# Patient Record
Sex: FEMALE | Race: WHITE | NOT HISPANIC OR LATINO | Employment: FULL TIME | URBAN - METROPOLITAN AREA
[De-identification: names, ages, dates, MRNs, and addresses within clinical notes are randomized per-mention and may not be internally consistent; named-entity substitution may affect disease eponyms.]

---

## 2017-01-18 ENCOUNTER — TELEPHONE (OUTPATIENT)
Dept: FAMILY MEDICINE CLINIC | Facility: CLINIC | Age: 32
End: 2017-01-18

## 2017-01-18 RX ORDER — DEXTROAMPHETAMINE SACCHARATE, AMPHETAMINE ASPARTATE MONOHYDRATE, DEXTROAMPHETAMINE SULFATE AND AMPHETAMINE SULFATE 7.5; 7.5; 7.5; 7.5 MG/1; MG/1; MG/1; MG/1
30 CAPSULE, EXTENDED RELEASE ORAL EVERY MORNING
Qty: 30 CAPSULE | Refills: 0 | Status: SHIPPED | OUTPATIENT
Start: 2017-01-18 | End: 2017-02-17 | Stop reason: SDUPTHER

## 2017-01-18 RX ORDER — HYDROCODONE BITARTRATE AND ACETAMINOPHEN 10; 325 MG/1; MG/1
1 TABLET ORAL 4 TIMES DAILY PRN
Qty: 120 TABLET | Refills: 0 | Status: SHIPPED | OUTPATIENT
Start: 2017-01-18 | End: 2017-02-17 | Stop reason: SDUPTHER

## 2017-01-18 RX ORDER — DEXTROAMPHETAMINE SACCHARATE, AMPHETAMINE ASPARTATE, DEXTROAMPHETAMINE SULFATE AND AMPHETAMINE SULFATE 3.75; 3.75; 3.75; 3.75 MG/1; MG/1; MG/1; MG/1
15 TABLET ORAL DAILY
Qty: 30 TABLET | Refills: 0 | Status: SHIPPED | OUTPATIENT
Start: 2017-01-18 | End: 2017-02-17 | Stop reason: SDUPTHER

## 2017-01-18 NOTE — TELEPHONE ENCOUNTER
RX'S ARE UP FRONT AND READY TO BE PICKED UP. PT IS AWARE.     ----- Message from Shania Harmon sent at 1/18/2017  1:26 PM EST -----  Contact: PT  PT NEEDS NEW WRITTEN RX'S    HYDROCODONE-ACETAM  MG 1 TAB 4 TIMES A DAY    AMPHETAMINE-DEXTRO 15 MG 1 TAB QD    AMPHETAMINE-DEXTRO 30 MG 24 HR CAPSULE 1 CAPSULE EVERY MORNING    PT'S # [phone]

## 2017-02-17 RX ORDER — DEXTROAMPHETAMINE SACCHARATE, AMPHETAMINE ASPARTATE MONOHYDRATE, DEXTROAMPHETAMINE SULFATE AND AMPHETAMINE SULFATE 7.5; 7.5; 7.5; 7.5 MG/1; MG/1; MG/1; MG/1
30 CAPSULE, EXTENDED RELEASE ORAL EVERY MORNING
Qty: 30 CAPSULE | Refills: 0 | Status: SHIPPED | OUTPATIENT
Start: 2017-02-17 | End: 2017-03-21 | Stop reason: SDUPTHER

## 2017-02-17 RX ORDER — DEXTROAMPHETAMINE SACCHARATE, AMPHETAMINE ASPARTATE, DEXTROAMPHETAMINE SULFATE AND AMPHETAMINE SULFATE 3.75; 3.75; 3.75; 3.75 MG/1; MG/1; MG/1; MG/1
15 TABLET ORAL DAILY
Qty: 30 TABLET | Refills: 0 | Status: SHIPPED | OUTPATIENT
Start: 2017-02-17 | End: 2017-03-21 | Stop reason: SDUPTHER

## 2017-02-17 RX ORDER — HYDROCODONE BITARTRATE AND ACETAMINOPHEN 10; 325 MG/1; MG/1
1 TABLET ORAL 4 TIMES DAILY PRN
Qty: 120 TABLET | Refills: 0 | Status: SHIPPED | OUTPATIENT
Start: 2017-02-17 | End: 2017-03-21 | Stop reason: SDUPTHER

## 2017-02-20 ENCOUNTER — TELEPHONE (OUTPATIENT)
Dept: FAMILY MEDICINE CLINIC | Facility: CLINIC | Age: 32
End: 2017-02-20

## 2017-02-20 NOTE — TELEPHONE ENCOUNTER
RX'S ARE UP FRONT AND READY TO BE PICKED UP. PT IS AWARE.     ----- Message from Coretta Morales sent at 2/17/2017 11:15 AM EST -----  NEEDS A WRITTEN RX     ADDERALL ER  15MG # 30     ADDERELL  TIME RELEASE 30 MG # 30     NORCO  #120     PLEASE CALL PT WHEN READY TO PU     [phone]

## 2017-03-21 ENCOUNTER — OFFICE VISIT (OUTPATIENT)
Dept: FAMILY MEDICINE CLINIC | Facility: CLINIC | Age: 32
End: 2017-03-21

## 2017-03-21 VITALS
TEMPERATURE: 97.7 F | WEIGHT: 217.6 LBS | BODY MASS INDEX: 34.97 KG/M2 | DIASTOLIC BLOOD PRESSURE: 70 MMHG | HEART RATE: 99 BPM | OXYGEN SATURATION: 99 % | HEIGHT: 66 IN | SYSTOLIC BLOOD PRESSURE: 110 MMHG

## 2017-03-21 DIAGNOSIS — G89.29 CHRONIC LOW BACK PAIN WITHOUT SCIATICA, UNSPECIFIED BACK PAIN LATERALITY: ICD-10-CM

## 2017-03-21 DIAGNOSIS — F98.8 ATTENTION DEFICIT DISORDER: Primary | ICD-10-CM

## 2017-03-21 DIAGNOSIS — M54.50 CHRONIC LOW BACK PAIN WITHOUT SCIATICA, UNSPECIFIED BACK PAIN LATERALITY: ICD-10-CM

## 2017-03-21 PROCEDURE — 99213 OFFICE O/P EST LOW 20 MIN: CPT | Performed by: INTERNAL MEDICINE

## 2017-03-21 RX ORDER — HYDROCODONE BITARTRATE AND ACETAMINOPHEN 10; 325 MG/1; MG/1
1 TABLET ORAL 4 TIMES DAILY PRN
Qty: 120 TABLET | Refills: 0 | Status: SHIPPED | OUTPATIENT
Start: 2017-03-21 | End: 2017-04-18 | Stop reason: SDUPTHER

## 2017-03-21 RX ORDER — DEXTROAMPHETAMINE SACCHARATE, AMPHETAMINE ASPARTATE MONOHYDRATE, DEXTROAMPHETAMINE SULFATE AND AMPHETAMINE SULFATE 7.5; 7.5; 7.5; 7.5 MG/1; MG/1; MG/1; MG/1
30 CAPSULE, EXTENDED RELEASE ORAL EVERY MORNING
Qty: 30 CAPSULE | Refills: 0 | Status: SHIPPED | OUTPATIENT
Start: 2017-03-21 | End: 2017-04-18 | Stop reason: SDUPTHER

## 2017-03-21 RX ORDER — DEXTROAMPHETAMINE SACCHARATE, AMPHETAMINE ASPARTATE, DEXTROAMPHETAMINE SULFATE AND AMPHETAMINE SULFATE 3.75; 3.75; 3.75; 3.75 MG/1; MG/1; MG/1; MG/1
15 TABLET ORAL DAILY
Qty: 30 TABLET | Refills: 0 | Status: SHIPPED | OUTPATIENT
Start: 2017-03-21 | End: 2017-04-18 | Stop reason: SDUPTHER

## 2017-03-21 NOTE — PROGRESS NOTES
Subjective   Bessy Mi is a 31 y.o. female. Patient is here today for follow-up on her chronic low back pain, ADD, intermittent anxiety the patient tells me that she is no longer on hormone treatments from one of the weight loss places.  Her back pain is present but controlled on current medicines and her ADD symptoms are under pretty good control again on her current regimen.  She's had no chest pain, shortness of breath, edema or significant myalgias.  She is not on any thyroid replacement either.  She does have an upcoming appointment with gynecologist in about 2 months    Chief Complaint   Patient presents with   • ADHD     NEEDS REFILLS ON ADDERALL'S    • Back Pain     NEEDS REFILL ON HYDROCODONE          Vitals:    03/21/17 1403   BP: 110/70   Pulse: 99   Temp: 97.7 °F (36.5 °C)   SpO2: 99%     The following portions of the patient's history were reviewed and updated as appropriate: allergies, current medications, past family history, past medical history, past social history, past surgical history and problem list.    Past Medical History   Diagnosis Date   • ADHD (attention deficit hyperactivity disorder)    • Anxiety    • Arthritis    • Hypothyroidism    • Low back pain    • Migraine headache       No Known Allergies   Social History     Social History   • Marital status:      Spouse name: N/A   • Number of children: N/A   • Years of education: N/A     Occupational History   • Not on file.     Social History Main Topics   • Smoking status: Current Every Day Smoker   • Smokeless tobacco: Not on file   • Alcohol use Yes      Comment: Very Rare   • Drug use: No   • Sexual activity: Not on file     Other Topics Concern   • Not on file     Social History Narrative        Current Outpatient Prescriptions:   •  amphetamine-dextroamphetamine (ADDERALL) 15 MG tablet, Take 1 tablet by mouth Daily., Disp: 30 tablet, Rfl: 0  •  amphetamine-dextroamphetamine XR (ADDERALL XR) 30 MG 24 hr capsule, Take 1  capsule by mouth Every Morning., Disp: 30 capsule, Rfl: 0  •  HYDROcodone-acetaminophen (NORCO)  MG per tablet, Take 1 tablet by mouth 4 (Four) Times a Day As Needed (PAIN)., Disp: 120 tablet, Rfl: 0     Objective     History of Present Illness     Review of Systems   Constitutional: Negative.    HENT: Negative.    Eyes: Negative.    Respiratory: Negative.    Cardiovascular: Negative.    Gastrointestinal: Negative.    Genitourinary: Negative.    Musculoskeletal: Negative.    Neurological: Negative.    Psychiatric/Behavioral: Negative.        Physical Exam   Constitutional: She appears well-developed and well-nourished.   Pleasant, cooperative and in no distress with a blood pressure of 120/80   HENT:   Head: Normocephalic and atraumatic.   Eyes: Conjunctivae are normal.   Neck: Normal range of motion. Neck supple. No thyromegaly present.   Cardiovascular: Normal rate, regular rhythm and normal heart sounds.    Pulmonary/Chest: Effort normal and breath sounds normal. No respiratory distress. She has no wheezes. She has no rales.   Musculoskeletal: Normal range of motion. She exhibits no edema.   Neurological: She is alert.   Skin: Skin is warm and dry.   Psychiatric: She has a normal mood and affect. Her behavior is normal.   Nursing note and vitals reviewed.      ASSESSMENT  overall the patient seems stable.  Blood pressures fine and heart and lungs sound fine her Tan was reviewed and is appropriate.     Problem List Items Addressed This Visit        Nervous and Auditory    Low back pain       Other    Attention deficit disorder - Primary    Relevant Medications    amphetamine-dextroamphetamine XR (ADDERALL XR) 30 MG 24 hr capsule    amphetamine-dextroamphetamine (ADDERALL) 15 MG tablet          PLAN  I refilled the patient's medications and she will continue his now and I'll recheck her in 3 months with a TSH and free T4, lipid panel and CMP    There are no Patient Instructions on file for this  visit.  Return in about 3 months (around 6/21/2017) for with labs.

## 2017-04-18 ENCOUNTER — TELEPHONE (OUTPATIENT)
Dept: FAMILY MEDICINE CLINIC | Facility: CLINIC | Age: 32
End: 2017-04-18

## 2017-04-18 RX ORDER — DEXTROAMPHETAMINE SACCHARATE, AMPHETAMINE ASPARTATE, DEXTROAMPHETAMINE SULFATE AND AMPHETAMINE SULFATE 3.75; 3.75; 3.75; 3.75 MG/1; MG/1; MG/1; MG/1
15 TABLET ORAL DAILY
Qty: 30 TABLET | Refills: 0 | Status: SHIPPED | OUTPATIENT
Start: 2017-04-18 | End: 2017-05-18 | Stop reason: SDUPTHER

## 2017-04-18 RX ORDER — HYDROCODONE BITARTRATE AND ACETAMINOPHEN 10; 325 MG/1; MG/1
1 TABLET ORAL 4 TIMES DAILY PRN
Qty: 120 TABLET | Refills: 0 | Status: SHIPPED | OUTPATIENT
Start: 2017-04-18 | End: 2017-05-18 | Stop reason: SDUPTHER

## 2017-04-18 RX ORDER — DEXTROAMPHETAMINE SACCHARATE, AMPHETAMINE ASPARTATE MONOHYDRATE, DEXTROAMPHETAMINE SULFATE AND AMPHETAMINE SULFATE 7.5; 7.5; 7.5; 7.5 MG/1; MG/1; MG/1; MG/1
30 CAPSULE, EXTENDED RELEASE ORAL EVERY MORNING
Qty: 30 CAPSULE | Refills: 0 | Status: SHIPPED | OUTPATIENT
Start: 2017-04-18 | End: 2017-05-18 | Stop reason: SDUPTHER

## 2017-04-18 NOTE — TELEPHONE ENCOUNTER
RX'S ARE UP FRONT AND READY TO BE PICKED UP. CALLED AND LEFT MESSAGE FOR PT THAT SHE CAN COME .    ----- Message from Coretta Morales sent at 4/18/2017  9:12 AM EDT -----  NEEDS WRITTEN RX FOR    ADDERRAL EXTENDED RELEASE  30 MG # 30     ADDERRAL INSTANT RELESASE  15 MG # 30     HYDROCODONE  #120    PLEASE CALL PT WHEN READY TO PU     [phone]

## 2017-05-18 RX ORDER — HYDROCODONE BITARTRATE AND ACETAMINOPHEN 10; 325 MG/1; MG/1
1 TABLET ORAL 4 TIMES DAILY PRN
Qty: 120 TABLET | Refills: 0 | Status: SHIPPED | OUTPATIENT
Start: 2017-05-18 | End: 2017-06-21 | Stop reason: SDUPTHER

## 2017-05-18 RX ORDER — DEXTROAMPHETAMINE SACCHARATE, AMPHETAMINE ASPARTATE, DEXTROAMPHETAMINE SULFATE AND AMPHETAMINE SULFATE 3.75; 3.75; 3.75; 3.75 MG/1; MG/1; MG/1; MG/1
15 TABLET ORAL DAILY
Qty: 30 TABLET | Refills: 0 | Status: SHIPPED | OUTPATIENT
Start: 2017-05-18 | End: 2017-06-21 | Stop reason: SDUPTHER

## 2017-05-18 RX ORDER — DEXTROAMPHETAMINE SACCHARATE, AMPHETAMINE ASPARTATE MONOHYDRATE, DEXTROAMPHETAMINE SULFATE AND AMPHETAMINE SULFATE 7.5; 7.5; 7.5; 7.5 MG/1; MG/1; MG/1; MG/1
30 CAPSULE, EXTENDED RELEASE ORAL EVERY MORNING
Qty: 30 CAPSULE | Refills: 0 | Status: SHIPPED | OUTPATIENT
Start: 2017-05-18 | End: 2017-06-21

## 2017-05-19 ENCOUNTER — TELEPHONE (OUTPATIENT)
Dept: FAMILY MEDICINE CLINIC | Facility: CLINIC | Age: 32
End: 2017-05-19

## 2017-05-31 DIAGNOSIS — Z13.29 THYROID DISORDER SCREEN: ICD-10-CM

## 2017-05-31 DIAGNOSIS — Z13.220 SCREENING FOR LIPID DISORDERS: Primary | ICD-10-CM

## 2017-06-19 ENCOUNTER — TELEPHONE (OUTPATIENT)
Dept: FAMILY MEDICINE CLINIC | Facility: CLINIC | Age: 32
End: 2017-06-19

## 2017-06-19 NOTE — TELEPHONE ENCOUNTER
PT IS DUE FOR AN APPT BEFORE REFILLS. CALLED PT AND ADVISED HER OF THIS. SHE MADE AN APPT FOR THIS WEEK WITH DR. WILLS FOR MED CHECK.     ----- Message from Nicolasa Nogueira sent at 6/19/2017 11:13 AM EDT -----  REFILL ON BOTH ADDERAL AND HYDROCODONE    PLEASE CALL WHEN READY [phone]

## 2017-06-21 ENCOUNTER — OFFICE VISIT (OUTPATIENT)
Dept: FAMILY MEDICINE CLINIC | Facility: CLINIC | Age: 32
End: 2017-06-21

## 2017-06-21 VITALS
SYSTOLIC BLOOD PRESSURE: 106 MMHG | TEMPERATURE: 97.8 F | HEART RATE: 74 BPM | HEIGHT: 66 IN | WEIGHT: 219.6 LBS | OXYGEN SATURATION: 98 % | DIASTOLIC BLOOD PRESSURE: 70 MMHG | BODY MASS INDEX: 35.29 KG/M2

## 2017-06-21 DIAGNOSIS — L70.0 ACNE VULGARIS: ICD-10-CM

## 2017-06-21 DIAGNOSIS — F98.8 ATTENTION DEFICIT DISORDER: ICD-10-CM

## 2017-06-21 DIAGNOSIS — G89.29 CHRONIC LOW BACK PAIN WITHOUT SCIATICA, UNSPECIFIED BACK PAIN LATERALITY: Primary | ICD-10-CM

## 2017-06-21 DIAGNOSIS — M54.50 CHRONIC LOW BACK PAIN WITHOUT SCIATICA, UNSPECIFIED BACK PAIN LATERALITY: Primary | ICD-10-CM

## 2017-06-21 PROCEDURE — 99213 OFFICE O/P EST LOW 20 MIN: CPT | Performed by: INTERNAL MEDICINE

## 2017-06-21 RX ORDER — HYDROCODONE BITARTRATE AND ACETAMINOPHEN 10; 325 MG/1; MG/1
1 TABLET ORAL 4 TIMES DAILY PRN
Qty: 120 TABLET | Refills: 0 | Status: SHIPPED | OUTPATIENT
Start: 2017-06-21 | End: 2017-07-18 | Stop reason: SDUPTHER

## 2017-06-21 RX ORDER — DEXTROAMPHETAMINE SACCHARATE, AMPHETAMINE ASPARTATE, DEXTROAMPHETAMINE SULFATE AND AMPHETAMINE SULFATE 3.75; 3.75; 3.75; 3.75 MG/1; MG/1; MG/1; MG/1
15 TABLET ORAL 3 TIMES DAILY
Qty: 90 TABLET | Refills: 0 | Status: SHIPPED | OUTPATIENT
Start: 2017-06-21 | End: 2017-07-18 | Stop reason: SDUPTHER

## 2017-06-21 RX ORDER — DOXYCYCLINE HYCLATE 100 MG
100 TABLET ORAL DAILY
Qty: 30 TABLET | Refills: 2 | Status: SHIPPED | OUTPATIENT
Start: 2017-06-21 | End: 2018-02-22

## 2017-06-21 NOTE — PROGRESS NOTES
Subjective   Bessy Mi is a 31 y.o. female. Patient is here today for follow-up on her low back pain, ADD.  Additionally she's been having a facial outbreak of acne and would like to try something for that.  She believes that the extended release Adderall may have a little something to do with it and in addition is been having difficulty getting it at the drugstore.  She would like to try and switch over to just the immediate release and take it a bit more often during the active hours.  Her back pain is stable and pain medicine and is getting her adequate relief.    Chief Complaint   Patient presents with   • ADD     needs prescription for adderall   • Back Pain     needs prescription for hydrocodone           Vitals:    06/21/17 0949   BP: 106/70   Pulse: 74   Temp: 97.8 °F (36.6 °C)   SpO2: 98%     The following portions of the patient's history were reviewed and updated as appropriate: allergies, current medications, past family history, past medical history, past social history, past surgical history and problem list.    Past Medical History:   Diagnosis Date   • ADHD (attention deficit hyperactivity disorder)    • Anxiety    • Arthritis    • Hypothyroidism    • Low back pain    • Migraine headache       No Known Allergies   Social History     Social History   • Marital status:      Spouse name: N/A   • Number of children: N/A   • Years of education: N/A     Occupational History   • Not on file.     Social History Main Topics   • Smoking status: Current Every Day Smoker   • Smokeless tobacco: Not on file   • Alcohol use Yes      Comment: Very Rare   • Drug use: No   • Sexual activity: Not on file     Other Topics Concern   • Not on file     Social History Narrative        Current Outpatient Prescriptions:   •  amphetamine-dextroamphetamine (ADDERALL) 15 MG tablet, Take 1 tablet by mouth 3 (Three) Times a Day., Disp: 90 tablet, Rfl: 0  •  HYDROcodone-acetaminophen (NORCO)  MG per tablet, Take  1 tablet by mouth 4 (Four) Times a Day As Needed (PAIN)., Disp: 120 tablet, Rfl: 0  •  doxycycline (VIBRAMYICN) 100 MG tablet, Take 1 tablet by mouth Daily., Disp: 30 tablet, Rfl: 2     Objective     History of Present Illness     Review of Systems   Constitutional: Negative.    HENT: Negative.    Eyes: Negative.    Respiratory: Negative.    Cardiovascular: Negative.    Gastrointestinal: Negative.    Genitourinary: Negative.    Musculoskeletal: Positive for back pain.   Skin:        Facial acne   Neurological: Negative.    Psychiatric/Behavioral: Negative.        Physical Exam   Constitutional: She appears well-developed and well-nourished.   Pleasant, cooperative and in no distress with a blood pressure of 120/80   HENT:   Head: Normocephalic and atraumatic.   Eyes: Conjunctivae are normal.   Neck: Normal range of motion.   Cardiovascular: Normal rate, regular rhythm and normal heart sounds.    Pulmonary/Chest: Effort normal and breath sounds normal. No respiratory distress. She has no wheezes. She has no rales.   Musculoskeletal: Normal range of motion.   Neurological: She is alert.   Skin: Skin is warm and dry.   Scattered acne lesions on the face   Psychiatric: She has a normal mood and affect. Her behavior is normal.   Nursing note and vitals reviewed.      ASSESSMENT  overall the patient seems stable.  Blood pressures fine and heart and lungs sound fine.  Her back pain is stable and under reasonable control.  Her ADD also is doing okay on current medicines.  Tan was reviewed and is appropriate.  She has some facial acne and has had difficulty getting the extended release Adderall.     Problem List Items Addressed This Visit        Nervous and Auditory    Low back pain - Primary       Musculoskeletal and Integument    Acne vulgaris       Other    Attention deficit disorder    Relevant Medications    amphetamine-dextroamphetamine (ADDERALL) 15 MG tablet          PLAN  I'm going to try her on doxycycline 100  mg daily for the acne.  I refilled her hydrocodone and we will change her Adderall to 15 mg 3 times a day.  Plan on rechecking her in 3 months.    There are no Patient Instructions on file for this visit.  No Follow-up on file.

## 2017-07-19 ENCOUNTER — TELEPHONE (OUTPATIENT)
Dept: FAMILY MEDICINE CLINIC | Facility: CLINIC | Age: 32
End: 2017-07-19

## 2017-07-19 RX ORDER — HYDROCODONE BITARTRATE AND ACETAMINOPHEN 10; 325 MG/1; MG/1
1 TABLET ORAL 4 TIMES DAILY PRN
Qty: 120 TABLET | Refills: 0 | Status: SHIPPED | OUTPATIENT
Start: 2017-07-19 | End: 2017-08-21 | Stop reason: SDUPTHER

## 2017-07-19 RX ORDER — DEXTROAMPHETAMINE SACCHARATE, AMPHETAMINE ASPARTATE, DEXTROAMPHETAMINE SULFATE AND AMPHETAMINE SULFATE 3.75; 3.75; 3.75; 3.75 MG/1; MG/1; MG/1; MG/1
15 TABLET ORAL 3 TIMES DAILY
Qty: 90 TABLET | Refills: 0 | Status: SHIPPED | OUTPATIENT
Start: 2017-07-19 | End: 2017-08-21 | Stop reason: SDUPTHER

## 2017-07-19 NOTE — TELEPHONE ENCOUNTER
RX'S ARE UP FRONT AND READY TO BE PICKED UP. CALLED AND LEFT MESSAGE FOR PT THAT SHE CAN COME .     ----- Message from Coretta Morales sent at 7/18/2017 12:26 PM EDT -----  NEEDS RX FILLED     ADDERRAL XR 15 MG    3 TIMES A DAY     HYDROCODONE 10/325 # 120     PLEASE CALL WHEN READY TO      [phone]

## 2017-08-21 RX ORDER — HYDROCODONE BITARTRATE AND ACETAMINOPHEN 10; 325 MG/1; MG/1
1 TABLET ORAL 4 TIMES DAILY PRN
Qty: 120 TABLET | Refills: 0 | Status: SHIPPED | OUTPATIENT
Start: 2017-08-21 | End: 2017-09-19 | Stop reason: SDUPTHER

## 2017-08-21 RX ORDER — DEXTROAMPHETAMINE SACCHARATE, AMPHETAMINE ASPARTATE, DEXTROAMPHETAMINE SULFATE AND AMPHETAMINE SULFATE 3.75; 3.75; 3.75; 3.75 MG/1; MG/1; MG/1; MG/1
15 TABLET ORAL 3 TIMES DAILY
Qty: 90 TABLET | Refills: 0 | Status: SHIPPED | OUTPATIENT
Start: 2017-08-21 | End: 2017-09-19 | Stop reason: SDUPTHER

## 2017-08-22 ENCOUNTER — TELEPHONE (OUTPATIENT)
Dept: FAMILY MEDICINE CLINIC | Facility: CLINIC | Age: 32
End: 2017-08-22

## 2017-08-22 NOTE — TELEPHONE ENCOUNTER
RX'S ARE UP FRONT AND READY TO BE PICKED UP. PT IS AWARE.    ----- Message from Brooklyn Baugh MA sent at 8/21/2017  8:33 AM EDT -----  Contact: PT   NEEDS RX REFILL FOR HYDROCODONE AND ADERAL IF THERE ARE ANY QUESTIONS SHE CAN BE REACHED -[phone]/ [facility] THE PT IS OUT OF THE RX

## 2017-09-19 ENCOUNTER — TELEPHONE (OUTPATIENT)
Dept: FAMILY MEDICINE CLINIC | Facility: CLINIC | Age: 32
End: 2017-09-19

## 2017-09-19 RX ORDER — DEXTROAMPHETAMINE SACCHARATE, AMPHETAMINE ASPARTATE, DEXTROAMPHETAMINE SULFATE AND AMPHETAMINE SULFATE 3.75; 3.75; 3.75; 3.75 MG/1; MG/1; MG/1; MG/1
15 TABLET ORAL 3 TIMES DAILY
Qty: 90 TABLET | Refills: 0 | Status: SHIPPED | OUTPATIENT
Start: 2017-09-19 | End: 2017-10-24

## 2017-09-19 RX ORDER — HYDROCODONE BITARTRATE AND ACETAMINOPHEN 10; 325 MG/1; MG/1
1 TABLET ORAL 4 TIMES DAILY PRN
Qty: 120 TABLET | Refills: 0 | Status: SHIPPED | OUTPATIENT
Start: 2017-09-19 | End: 2017-10-24 | Stop reason: SDUPTHER

## 2017-09-20 ENCOUNTER — TELEPHONE (OUTPATIENT)
Dept: FAMILY MEDICINE CLINIC | Facility: CLINIC | Age: 32
End: 2017-09-20

## 2017-09-20 NOTE — TELEPHONE ENCOUNTER
WE ARE NOT ABLE TO DO PRIOR AUTH WITH PASSPORT, AS WE DO NOT ACCEPT IT. NOR CAN WE SEE THE PATIENT IF SHE HAS PASSPORT. I CALLED PATIENT AND ADVISED HER OF THIS.     ----- Message from Brooklyn Baugh MA sent at 9/20/2017 11:05 AM EDT -----  Contact: PT  PHARMACY NEEDS US TO CALL FOR PRIOR AUTH ON HER HYDROCODONE SHE DID  THE SCRIPT BUT THE PHARMACY NEEDS US TO CALL SHE USES [facility]  [phone]/ PT PHONE NUMBER [phone]    ID NUMBER PASSPORT [id number] [facility] [id number] RX [id number]    PT LOST HER JOB AND NOW HAS PASSPORT I HAVE ADVISED WE DO NOT TAKE PASSPORT SHE SAID THIS IS TEMPORARY AND SHE WILL SEE US AGAIN WHEN SHE GETS A DIFFERENT KIND OF INSURANCE

## 2017-10-24 ENCOUNTER — OFFICE VISIT (OUTPATIENT)
Dept: FAMILY MEDICINE CLINIC | Facility: CLINIC | Age: 32
End: 2017-10-24

## 2017-10-24 VITALS
HEIGHT: 66 IN | WEIGHT: 214 LBS | DIASTOLIC BLOOD PRESSURE: 70 MMHG | BODY MASS INDEX: 34.39 KG/M2 | OXYGEN SATURATION: 99 % | TEMPERATURE: 98.1 F | HEART RATE: 85 BPM | SYSTOLIC BLOOD PRESSURE: 120 MMHG

## 2017-10-24 DIAGNOSIS — F90.0 ATTENTION DEFICIT HYPERACTIVITY DISORDER (ADHD), PREDOMINANTLY INATTENTIVE TYPE: ICD-10-CM

## 2017-10-24 DIAGNOSIS — M54.50 CHRONIC LOW BACK PAIN WITHOUT SCIATICA, UNSPECIFIED BACK PAIN LATERALITY: Primary | ICD-10-CM

## 2017-10-24 DIAGNOSIS — G89.29 CHRONIC LOW BACK PAIN WITHOUT SCIATICA, UNSPECIFIED BACK PAIN LATERALITY: Primary | ICD-10-CM

## 2017-10-24 PROCEDURE — 99213 OFFICE O/P EST LOW 20 MIN: CPT | Performed by: INTERNAL MEDICINE

## 2017-10-24 RX ORDER — DEXTROAMPHETAMINE SACCHARATE, AMPHETAMINE ASPARTATE MONOHYDRATE, DEXTROAMPHETAMINE SULFATE AND AMPHETAMINE SULFATE 7.5; 7.5; 7.5; 7.5 MG/1; MG/1; MG/1; MG/1
30 CAPSULE, EXTENDED RELEASE ORAL EVERY MORNING
Qty: 30 CAPSULE | Refills: 0 | Status: SHIPPED | OUTPATIENT
Start: 2017-10-24 | End: 2017-11-21 | Stop reason: SDUPTHER

## 2017-10-24 RX ORDER — HYDROCODONE BITARTRATE AND ACETAMINOPHEN 10; 325 MG/1; MG/1
1 TABLET ORAL 4 TIMES DAILY PRN
Qty: 120 TABLET | Refills: 0 | Status: SHIPPED | OUTPATIENT
Start: 2017-10-24 | End: 2017-11-21 | Stop reason: SDUPTHER

## 2017-10-24 RX ORDER — DEXTROAMPHETAMINE SACCHARATE, AMPHETAMINE ASPARTATE, DEXTROAMPHETAMINE SULFATE AND AMPHETAMINE SULFATE 3.75; 3.75; 3.75; 3.75 MG/1; MG/1; MG/1; MG/1
15 TABLET ORAL DAILY
Qty: 30 TABLET | Refills: 0 | Status: SHIPPED | OUTPATIENT
Start: 2017-10-24 | End: 2017-11-21 | Stop reason: SDUPTHER

## 2017-10-24 NOTE — PROGRESS NOTES
Subjective   Bessy Mi is a 32 y.o. female. Patient is here today for follow-up on her chronic low back pain and ADD.  She tells me she lost one job and insurance and was unable to get the Adderall for about a month and could really tell the difference.  Insurance is now on effect and we'll cover an extended release and want immediate release tablet daily.  Her low back pain persists but is controlled by the hydrocodone.  She needs medicine refills.  Otherwise she has no new acute problems.  She refuses a flu shot.    Chief Complaint   Patient presents with   • ADD     AND LOW BACK PAIN- NEEDS REFILL ON ADDERALL AND HYDROCODONE          Vitals:    10/24/17 1538   BP: 120/70   Pulse: 85   Temp: 98.1 °F (36.7 °C)   SpO2: 99%     The following portions of the patient's history were reviewed and updated as appropriate: allergies, current medications, past family history, past medical history, past social history, past surgical history and problem list.    Past Medical History:   Diagnosis Date   • ADHD (attention deficit hyperactivity disorder)    • Anxiety    • Arthritis    • Hypothyroidism    • Low back pain    • Migraine headache       No Known Allergies   Social History     Social History   • Marital status:      Spouse name: N/A   • Number of children: N/A   • Years of education: N/A     Occupational History   • Not on file.     Social History Main Topics   • Smoking status: Current Every Day Smoker   • Smokeless tobacco: Not on file   • Alcohol use Yes      Comment: Very Rare   • Drug use: No   • Sexual activity: Not on file     Other Topics Concern   • Not on file     Social History Narrative        Current Outpatient Prescriptions:   •  doxycycline (VIBRAMYICN) 100 MG tablet, Take 1 tablet by mouth Daily., Disp: 30 tablet, Rfl: 2  •  HYDROcodone-acetaminophen (NORCO)  MG per tablet, Take 1 tablet by mouth 4 (Four) Times a Day As Needed (PAIN)., Disp: 120 tablet, Rfl: 0  •   amphetamine-dextroamphetamine (ADDERALL) 15 MG tablet, Take 1 tablet by mouth Daily., Disp: 30 tablet, Rfl: 0  •  amphetamine-dextroamphetamine XR (ADDERALL XR) 30 MG 24 hr capsule, Take 1 capsule by mouth Every Morning., Disp: 30 capsule, Rfl: 0     Objective     History of Present Illness     Review of Systems   Constitutional: Negative.    HENT: Negative.    Eyes: Negative.    Respiratory: Negative.    Cardiovascular: Negative.    Gastrointestinal: Negative.    Endocrine: Negative.    Genitourinary: Negative.    Musculoskeletal: Positive for back pain.   Neurological: Negative.    Psychiatric/Behavioral: Positive for decreased concentration.       Physical Exam   Constitutional: She is oriented to person, place, and time. She appears well-developed and well-nourished.   Pleasant, cooperative no distress with a blood pressure 120/80   HENT:   Head: Normocephalic and atraumatic.   Eyes: Conjunctivae are normal. Pupils are equal, round, and reactive to light. No scleral icterus.   Neck: Normal range of motion. Neck supple.   Cardiovascular: Normal rate, regular rhythm and normal heart sounds.    Pulmonary/Chest: Effort normal and breath sounds normal. No respiratory distress. She has no wheezes. She has no rales.   Musculoskeletal: Normal range of motion. She exhibits no edema.   Neurological: She is alert and oriented to person, place, and time.   Skin: Skin is warm and dry.   Psychiatric: She has a normal mood and affect. Her behavior is normal.   Nursing note and vitals reviewed.      ASSESSMENT  patient's ADD is adequately controlled on her current medicine.  She can definitely tell the difference when she was off her medicine the last month.  Patient's low back pain persists but is stable.  The pain medication helps and keeps her functional.  Her Tan was reviewed and is appropriate.     Problem List Items Addressed This Visit        Nervous and Auditory    Low back pain - Primary       Other    Attention  deficit disorder          PLAN  The patient will take Adderall extended release 30 mg in the morning and Adderall 15 mg plain in the afternoon.  I refilled her hydrocodone.  I plan on rechecking her in 3 months with a CBC, CMP, lipid panel    There are no Patient Instructions on file for this visit.  Return in about 3 months (around 1/24/2018) for with labs.

## 2017-11-21 ENCOUNTER — TELEPHONE (OUTPATIENT)
Dept: FAMILY MEDICINE CLINIC | Facility: CLINIC | Age: 32
End: 2017-11-21

## 2017-11-21 RX ORDER — HYDROCODONE BITARTRATE AND ACETAMINOPHEN 10; 325 MG/1; MG/1
1 TABLET ORAL 4 TIMES DAILY PRN
Qty: 120 TABLET | Refills: 0 | Status: SHIPPED | OUTPATIENT
Start: 2017-11-21 | End: 2017-12-20 | Stop reason: SDUPTHER

## 2017-11-21 RX ORDER — DEXTROAMPHETAMINE SACCHARATE, AMPHETAMINE ASPARTATE MONOHYDRATE, DEXTROAMPHETAMINE SULFATE AND AMPHETAMINE SULFATE 7.5; 7.5; 7.5; 7.5 MG/1; MG/1; MG/1; MG/1
30 CAPSULE, EXTENDED RELEASE ORAL EVERY MORNING
Qty: 30 CAPSULE | Refills: 0 | Status: SHIPPED | OUTPATIENT
Start: 2017-11-21 | End: 2017-12-20 | Stop reason: SDUPTHER

## 2017-11-21 RX ORDER — DEXTROAMPHETAMINE SACCHARATE, AMPHETAMINE ASPARTATE, DEXTROAMPHETAMINE SULFATE AND AMPHETAMINE SULFATE 3.75; 3.75; 3.75; 3.75 MG/1; MG/1; MG/1; MG/1
15 TABLET ORAL DAILY
Qty: 30 TABLET | Refills: 0 | Status: SHIPPED | OUTPATIENT
Start: 2017-11-21 | End: 2017-12-20 | Stop reason: SDUPTHER

## 2017-11-21 NOTE — TELEPHONE ENCOUNTER
RX IS UP FRONT AND READY TO BE PICKED UP. PT IS AWARE.     ----- Message from Nicolasa Nogueira sent at 11/20/2017  3:58 PM EST -----  REFILL ON HYDROCODONE 10/325, ADDERALL 30MG, AND ADDERALL 15    PLEASE CALL PT WHEN READY [phone]

## 2017-12-20 ENCOUNTER — TELEPHONE (OUTPATIENT)
Dept: FAMILY MEDICINE CLINIC | Facility: CLINIC | Age: 32
End: 2017-12-20

## 2017-12-20 RX ORDER — DEXTROAMPHETAMINE SACCHARATE, AMPHETAMINE ASPARTATE MONOHYDRATE, DEXTROAMPHETAMINE SULFATE AND AMPHETAMINE SULFATE 7.5; 7.5; 7.5; 7.5 MG/1; MG/1; MG/1; MG/1
30 CAPSULE, EXTENDED RELEASE ORAL EVERY MORNING
Qty: 30 CAPSULE | Refills: 0 | Status: SHIPPED | OUTPATIENT
Start: 2017-12-20 | End: 2018-01-17 | Stop reason: SDUPTHER

## 2017-12-20 RX ORDER — HYDROCODONE BITARTRATE AND ACETAMINOPHEN 10; 325 MG/1; MG/1
1 TABLET ORAL 4 TIMES DAILY PRN
Qty: 120 TABLET | Refills: 0 | Status: SHIPPED | OUTPATIENT
Start: 2017-12-20 | End: 2018-01-17 | Stop reason: SDUPTHER

## 2017-12-20 RX ORDER — DEXTROAMPHETAMINE SACCHARATE, AMPHETAMINE ASPARTATE, DEXTROAMPHETAMINE SULFATE AND AMPHETAMINE SULFATE 3.75; 3.75; 3.75; 3.75 MG/1; MG/1; MG/1; MG/1
15 TABLET ORAL DAILY
Qty: 30 TABLET | Refills: 0 | Status: SHIPPED | OUTPATIENT
Start: 2017-12-20 | End: 2018-01-17 | Stop reason: SDUPTHER

## 2017-12-20 NOTE — TELEPHONE ENCOUNTER
RX'S ARE UP FRONT AND READY TO BE PICKED UP. PT IS AWARE.     ----- Message from Brooklyn Baugh MA sent at 12/19/2017 12:46 PM EST -----  Contact: PT  PT NEEDS RX REFILL FOR amphetamine-dextroamphetamine (ADDERALL) 15 MG tablet QTY 30     amphetamine-dextroamphetamine XR (ADDERALL XR) 30 MG 24 hr capsule QTY 30 AND     HYDROcodone-acetaminophen (NORCO)  MG per tablet     PT CAN BE REACHED [phone]

## 2018-01-08 DIAGNOSIS — Z13.220 SCREENING CHOLESTEROL LEVEL: ICD-10-CM

## 2018-01-08 DIAGNOSIS — F41.9 ANXIETY: ICD-10-CM

## 2018-01-17 RX ORDER — DEXTROAMPHETAMINE SACCHARATE, AMPHETAMINE ASPARTATE, DEXTROAMPHETAMINE SULFATE AND AMPHETAMINE SULFATE 3.75; 3.75; 3.75; 3.75 MG/1; MG/1; MG/1; MG/1
15 TABLET ORAL DAILY
Qty: 30 TABLET | Refills: 0 | Status: SHIPPED | OUTPATIENT
Start: 2018-01-17 | End: 2018-02-22 | Stop reason: SDUPTHER

## 2018-01-17 RX ORDER — DEXTROAMPHETAMINE SACCHARATE, AMPHETAMINE ASPARTATE MONOHYDRATE, DEXTROAMPHETAMINE SULFATE AND AMPHETAMINE SULFATE 7.5; 7.5; 7.5; 7.5 MG/1; MG/1; MG/1; MG/1
30 CAPSULE, EXTENDED RELEASE ORAL EVERY MORNING
Qty: 30 CAPSULE | Refills: 0 | Status: SHIPPED | OUTPATIENT
Start: 2018-01-17 | End: 2018-02-22 | Stop reason: DRUGHIGH

## 2018-01-17 RX ORDER — HYDROCODONE BITARTRATE AND ACETAMINOPHEN 10; 325 MG/1; MG/1
1 TABLET ORAL 4 TIMES DAILY PRN
Qty: 120 TABLET | Refills: 0 | Status: SHIPPED | OUTPATIENT
Start: 2018-01-17 | End: 2018-02-22 | Stop reason: SDUPTHER

## 2018-01-18 ENCOUNTER — TELEPHONE (OUTPATIENT)
Dept: FAMILY MEDICINE CLINIC | Facility: CLINIC | Age: 33
End: 2018-01-18

## 2018-01-18 NOTE — TELEPHONE ENCOUNTER
RX'S ARE UP FRONT AND READY TO BE PICKED UP. CALLED AND ADVISED PATIENT THAT THEY WERE READY, BUT PT MUST HAVE LABS AND A FOLLOW UP BEFORE ANY FURTHER REFILLS AND IF PT STILL HAS PASSPORT, SHE CANNOT BE SEEN AT ALL AND WILL NEED TO FIND ANOTHER PROVIDER. PT VOICED UNDERSTANDING.     ----- Message from Nicolasa Nogueira sent at 1/17/2018  9:00 AM EST -----  REFILL ON ADDERALL 15MG ADDERALL 30MG HYDROCODONE 10/325    PLEASE CALL PT WHEN READY [phone]

## 2018-02-15 LAB
ALBUMIN SERPL-MCNC: 3.9 G/DL (ref 3.5–5.2)
ALBUMIN/GLOB SERPL: 1.3 G/DL
ALP SERPL-CCNC: 48 U/L (ref 39–117)
ALT SERPL-CCNC: 19 U/L (ref 1–33)
AST SERPL-CCNC: 29 U/L (ref 1–32)
BASOPHILS # BLD AUTO: 0.03 10*3/MM3 (ref 0–0.2)
BASOPHILS NFR BLD AUTO: 0.5 % (ref 0–1.5)
BILIRUB SERPL-MCNC: 0.7 MG/DL (ref 0.1–1.2)
BUN SERPL-MCNC: 12 MG/DL (ref 6–20)
BUN/CREAT SERPL: 14.1 (ref 7–25)
CALCIUM SERPL-MCNC: 9 MG/DL (ref 8.6–10.5)
CHLORIDE SERPL-SCNC: 102 MMOL/L (ref 98–107)
CHOLEST SERPL-MCNC: 222 MG/DL (ref 0–200)
CO2 SERPL-SCNC: 22.9 MMOL/L (ref 22–29)
CREAT SERPL-MCNC: 0.85 MG/DL (ref 0.57–1)
EOSINOPHIL # BLD AUTO: 0.05 10*3/MM3 (ref 0–0.7)
EOSINOPHIL NFR BLD AUTO: 0.9 % (ref 0.3–6.2)
ERYTHROCYTE [DISTWIDTH] IN BLOOD BY AUTOMATED COUNT: 14.5 % (ref 11.7–13)
GFR SERPLBLD CREATININE-BSD FMLA CKD-EPI: 78 ML/MIN/1.73
GFR SERPLBLD CREATININE-BSD FMLA CKD-EPI: 95 ML/MIN/1.73
GLOBULIN SER CALC-MCNC: 2.9 GM/DL
GLUCOSE SERPL-MCNC: 87 MG/DL (ref 65–99)
HCT VFR BLD AUTO: 39.7 % (ref 35.6–45.5)
HDLC SERPL-MCNC: 80 MG/DL (ref 40–60)
HGB BLD-MCNC: 13 G/DL (ref 11.9–15.5)
IMM GRANULOCYTES # BLD: 0 10*3/MM3 (ref 0–0.03)
IMM GRANULOCYTES NFR BLD: 0 % (ref 0–0.5)
LDLC SERPL CALC-MCNC: 132 MG/DL (ref 0–100)
LDLC/HDLC SERPL: 1.65 {RATIO}
LYMPHOCYTES # BLD AUTO: 2.14 10*3/MM3 (ref 0.9–4.8)
LYMPHOCYTES NFR BLD AUTO: 36.9 % (ref 19.6–45.3)
MCH RBC QN AUTO: 31.4 PG (ref 26.9–32)
MCHC RBC AUTO-ENTMCNC: 32.7 G/DL (ref 32.4–36.3)
MCV RBC AUTO: 95.9 FL (ref 80.5–98.2)
MONOCYTES # BLD AUTO: 0.49 10*3/MM3 (ref 0.2–1.2)
MONOCYTES NFR BLD AUTO: 8.4 % (ref 5–12)
NEUTROPHILS # BLD AUTO: 3.09 10*3/MM3 (ref 1.9–8.1)
NEUTROPHILS NFR BLD AUTO: 53.3 % (ref 42.7–76)
PLATELET # BLD AUTO: 230 10*3/MM3 (ref 140–500)
POTASSIUM SERPL-SCNC: 5.3 MMOL/L (ref 3.5–5.2)
PROT SERPL-MCNC: 6.8 G/DL (ref 6–8.5)
RBC # BLD AUTO: 4.14 10*6/MM3 (ref 3.9–5.2)
SODIUM SERPL-SCNC: 138 MMOL/L (ref 136–145)
TRIGL SERPL-MCNC: 52 MG/DL (ref 0–150)
VLDLC SERPL CALC-MCNC: 10.4 MG/DL (ref 5–40)
WBC # BLD AUTO: 5.8 10*3/MM3 (ref 4.5–10.7)

## 2018-02-22 ENCOUNTER — OFFICE VISIT (OUTPATIENT)
Dept: FAMILY MEDICINE CLINIC | Facility: CLINIC | Age: 33
End: 2018-02-22

## 2018-02-22 VITALS
OXYGEN SATURATION: 99 % | SYSTOLIC BLOOD PRESSURE: 112 MMHG | DIASTOLIC BLOOD PRESSURE: 72 MMHG | HEART RATE: 66 BPM | TEMPERATURE: 98.3 F | HEIGHT: 66 IN | WEIGHT: 205.4 LBS | BODY MASS INDEX: 33.01 KG/M2

## 2018-02-22 DIAGNOSIS — M54.50 CHRONIC LOW BACK PAIN WITHOUT SCIATICA, UNSPECIFIED BACK PAIN LATERALITY: ICD-10-CM

## 2018-02-22 DIAGNOSIS — G89.29 CHRONIC LOW BACK PAIN WITHOUT SCIATICA, UNSPECIFIED BACK PAIN LATERALITY: ICD-10-CM

## 2018-02-22 DIAGNOSIS — L70.0 ACNE VULGARIS: ICD-10-CM

## 2018-02-22 DIAGNOSIS — E78.5 HYPERLIPIDEMIA, UNSPECIFIED HYPERLIPIDEMIA TYPE: ICD-10-CM

## 2018-02-22 DIAGNOSIS — F90.0 ATTENTION DEFICIT HYPERACTIVITY DISORDER (ADHD), PREDOMINANTLY INATTENTIVE TYPE: Primary | ICD-10-CM

## 2018-02-22 PROCEDURE — 99214 OFFICE O/P EST MOD 30 MIN: CPT | Performed by: INTERNAL MEDICINE

## 2018-02-22 RX ORDER — DEXTROAMPHETAMINE SACCHARATE, AMPHETAMINE ASPARTATE, DEXTROAMPHETAMINE SULFATE AND AMPHETAMINE SULFATE 3.75; 3.75; 3.75; 3.75 MG/1; MG/1; MG/1; MG/1
15 TABLET ORAL 3 TIMES DAILY
Qty: 90 TABLET | Refills: 0 | Status: SHIPPED | OUTPATIENT
Start: 2018-02-22 | End: 2018-03-20 | Stop reason: SDUPTHER

## 2018-02-22 RX ORDER — HYDROCODONE BITARTRATE AND ACETAMINOPHEN 10; 325 MG/1; MG/1
1 TABLET ORAL 4 TIMES DAILY PRN
Qty: 120 TABLET | Refills: 0 | Status: SHIPPED | OUTPATIENT
Start: 2018-02-22 | End: 2018-03-20 | Stop reason: SDUPTHER

## 2018-02-22 RX ORDER — DOXYCYCLINE HYCLATE 100 MG
100 TABLET ORAL DAILY
Qty: 30 TABLET | Refills: 2 | Status: SHIPPED | OUTPATIENT
Start: 2018-02-22 | End: 2018-05-18

## 2018-02-22 NOTE — PROGRESS NOTES
Subjective   Bessy Mi is a 32 y.o. female. Patient is here today for follow-up on her hyperlipidemia, chronic low back pain and ADD.  She generally stable but says the extended release Adderall does not work as well.  She does get a little jittery with it at times and she feels contributes to her acne.  She has had a flare of some acne vulgaris on the face and did well on doxycycline in the past.  She continues with chronic low back pain and her job entails standing on hard floors a lot.  The pain medication keeps her functional.  Chief Complaint   Patient presents with   • ADD     lab follow up and med check           Vitals:    02/22/18 1244   BP: 112/72   Pulse: 66   Temp: 98.3 °F (36.8 °C)   SpO2: 99%     The following portions of the patient's history were reviewed and updated as appropriate: allergies, current medications, past family history, past medical history, past social history, past surgical history and problem list.    Past Medical History:   Diagnosis Date   • ADHD (attention deficit hyperactivity disorder)    • Anxiety    • Arthritis    • Hypothyroidism    • Low back pain    • Migraine headache       No Known Allergies   Social History     Social History   • Marital status:      Spouse name: N/A   • Number of children: N/A   • Years of education: N/A     Occupational History   • Not on file.     Social History Main Topics   • Smoking status: Current Every Day Smoker   • Smokeless tobacco: Not on file   • Alcohol use Yes      Comment: Very Rare   • Drug use: No   • Sexual activity: Not on file     Other Topics Concern   • Not on file     Social History Narrative        Current Outpatient Prescriptions:   •  amphetamine-dextroamphetamine (ADDERALL) 15 MG tablet, Take 1 tablet by mouth 3 (Three) Times a Day., Disp: 90 tablet, Rfl: 0  •  HYDROcodone-acetaminophen (NORCO)  MG per tablet, Take 1 tablet by mouth 4 (Four) Times a Day As Needed (PAIN)., Disp: 120 tablet, Rfl: 0  •   doxycycline (VIBRAMYICN) 100 MG tablet, Take 1 tablet by mouth Daily., Disp: 30 tablet, Rfl: 2     Objective     History of Present Illness     Review of Systems   Constitutional: Negative.    HENT: Negative.    Eyes: Negative.    Respiratory: Negative.    Cardiovascular: Negative.    Gastrointestinal: Negative.    Endocrine: Negative.    Genitourinary: Negative.    Musculoskeletal: Positive for back pain.   Neurological: Negative.    Psychiatric/Behavioral: Negative.        Physical Exam   Constitutional: She is oriented to person, place, and time. She appears well-developed and well-nourished.   Pleasant, cooperative no distress blood pressure 120/80   HENT:   Head: Normocephalic and atraumatic.   Eyes: Conjunctivae are normal. Pupils are equal, round, and reactive to light. No scleral icterus.   Neck: Normal range of motion. Neck supple. No thyromegaly present.   Cardiovascular: Normal rate, regular rhythm and normal heart sounds.    Pulmonary/Chest: Effort normal and breath sounds normal. No respiratory distress. She has no wheezes. She has no rales.   Musculoskeletal: Normal range of motion. She exhibits no edema.   Neurological: She is alert and oriented to person, place, and time.   Skin: Skin is warm and dry.   Facial acne   Psychiatric: She has a normal mood and affect. Her behavior is normal.   Nursing note and vitals reviewed.      ASSESSMENT  CBC is essentially normal.  CMP was also normal aside from a potassium of 5.3 with a comment that there was hemolysis.  Lipid panel is a bit high with a total cholesterol 222, HDL of 80 but an .  #1-ADD, controlled on medication and.  Will adjust to the regular Adderall 3 times a day, same total dosage  #2-chronic low back pain, stable and controlled on medication  #3-acne, primarily facial  #4-hyperlipidemia  The patient's Tan was reviewed and is appropriate.     Problem List Items Addressed This Visit        Cardiovascular and Mediastinum     Hyperlipidemia       Nervous and Auditory    Low back pain       Musculoskeletal and Integument    Acne vulgaris       Other    Attention deficit disorder - Primary          PLAN  I refilled the patient's hydrocodone.  I'm adjusting her Adderall to 15 mg tablets, 1 by mouth 3 times a day, same total dosage.  I also got the patient a prescription for doxycycline for her acne.  I plan on rechecking her in 3 months with a lipid panel.    There are no Patient Instructions on file for this visit.  Return in about 3 months (around 5/22/2018) for with labs.

## 2018-02-27 ENCOUNTER — TELEPHONE (OUTPATIENT)
Dept: FAMILY MEDICINE CLINIC | Facility: CLINIC | Age: 33
End: 2018-02-27

## 2018-02-27 NOTE — TELEPHONE ENCOUNTER
PA FOR ADDERALL HAS BEEN APPROVED. CALLED AND LEFT MESSAGE FOR PT ADVISING HER OF THIS AND PHARMACY IS AWARE.     ----- Message from Fany Khanna MA sent at 2/22/2018  4:25 PM EST -----  Contact: PT      ----- Message -----     From: Brooklyn Baugh MA     Sent: 2/22/2018   2:41 PM       To: Fany Khanna MA    PT CALLED CHECKING ON ADDERALL PRIOR AUTH      PT CAN BE REACHED [phone]      [facility]

## 2018-03-20 ENCOUNTER — TELEPHONE (OUTPATIENT)
Dept: FAMILY MEDICINE CLINIC | Facility: CLINIC | Age: 33
End: 2018-03-20

## 2018-03-20 RX ORDER — HYDROCODONE BITARTRATE AND ACETAMINOPHEN 10; 325 MG/1; MG/1
1 TABLET ORAL 4 TIMES DAILY PRN
Qty: 120 TABLET | Refills: 0 | Status: SHIPPED | OUTPATIENT
Start: 2018-03-20 | End: 2018-04-24 | Stop reason: SDUPTHER

## 2018-03-20 RX ORDER — DEXTROAMPHETAMINE SACCHARATE, AMPHETAMINE ASPARTATE, DEXTROAMPHETAMINE SULFATE AND AMPHETAMINE SULFATE 3.75; 3.75; 3.75; 3.75 MG/1; MG/1; MG/1; MG/1
15 TABLET ORAL 3 TIMES DAILY
Qty: 90 TABLET | Refills: 0 | Status: SHIPPED | OUTPATIENT
Start: 2018-03-20 | End: 2018-04-24 | Stop reason: SDUPTHER

## 2018-03-20 NOTE — TELEPHONE ENCOUNTER
RX'S ARE UP FRONT AND READY TO BE PICKED UP. PT IS AWARE.     ----- Message from Brooklyn Baugh MA sent at 3/20/2018 12:41 PM EDT -----  Contact: PT  PT CAN BE REACHED [phone]      PT NEEDS RX REFILL FOR HYDROcodone-acetaminophen (NORCO)  MG per tablet  AND amphetamine-dextroamphetamine (ADDERALL) 15 MG tablet QTY 90

## 2018-03-22 ENCOUNTER — TELEPHONE (OUTPATIENT)
Dept: FAMILY MEDICINE CLINIC | Facility: CLINIC | Age: 33
End: 2018-03-22

## 2018-04-24 RX ORDER — DEXTROAMPHETAMINE SACCHARATE, AMPHETAMINE ASPARTATE, DEXTROAMPHETAMINE SULFATE AND AMPHETAMINE SULFATE 3.75; 3.75; 3.75; 3.75 MG/1; MG/1; MG/1; MG/1
15 TABLET ORAL 3 TIMES DAILY
Qty: 90 TABLET | Refills: 0 | Status: SHIPPED | OUTPATIENT
Start: 2018-04-24 | End: 2018-05-18 | Stop reason: SDUPTHER

## 2018-04-24 RX ORDER — HYDROCODONE BITARTRATE AND ACETAMINOPHEN 10; 325 MG/1; MG/1
1 TABLET ORAL 4 TIMES DAILY PRN
Qty: 120 TABLET | Refills: 0 | Status: SHIPPED | OUTPATIENT
Start: 2018-04-24 | End: 2018-05-18 | Stop reason: SDUPTHER

## 2018-04-25 ENCOUNTER — TELEPHONE (OUTPATIENT)
Dept: FAMILY MEDICINE CLINIC | Facility: CLINIC | Age: 33
End: 2018-04-25

## 2018-04-25 NOTE — TELEPHONE ENCOUNTER
RX'S ARE UP FRONT AND READY TO BE PICKED UP. CALLED AND LEFT MESSAGE FOR PT THAT SHE CAN COME .     ----- Message from Nicolasa Keith sent at 4/23/2018  8:29 AM EDT -----  REFILL ON HYDROCODONE 10/325MG QTY: 120 AND ADDERALL 15MG 1QD QTY: 90    [phone]

## 2018-05-18 ENCOUNTER — OFFICE VISIT (OUTPATIENT)
Dept: FAMILY MEDICINE CLINIC | Facility: CLINIC | Age: 33
End: 2018-05-18

## 2018-05-18 VITALS
TEMPERATURE: 98.3 F | HEIGHT: 66 IN | DIASTOLIC BLOOD PRESSURE: 64 MMHG | OXYGEN SATURATION: 99 % | SYSTOLIC BLOOD PRESSURE: 110 MMHG | BODY MASS INDEX: 33.04 KG/M2 | WEIGHT: 205.6 LBS | HEART RATE: 78 BPM

## 2018-05-18 DIAGNOSIS — M54.50 CHRONIC LOW BACK PAIN WITHOUT SCIATICA, UNSPECIFIED BACK PAIN LATERALITY: Primary | ICD-10-CM

## 2018-05-18 DIAGNOSIS — M54.50 CHRONIC LOW BACK PAIN WITHOUT SCIATICA, UNSPECIFIED BACK PAIN LATERALITY: ICD-10-CM

## 2018-05-18 DIAGNOSIS — F90.0 ATTENTION DEFICIT HYPERACTIVITY DISORDER (ADHD), PREDOMINANTLY INATTENTIVE TYPE: ICD-10-CM

## 2018-05-18 DIAGNOSIS — G89.29 CHRONIC LOW BACK PAIN WITHOUT SCIATICA, UNSPECIFIED BACK PAIN LATERALITY: Primary | ICD-10-CM

## 2018-05-18 DIAGNOSIS — G89.29 CHRONIC LOW BACK PAIN WITHOUT SCIATICA, UNSPECIFIED BACK PAIN LATERALITY: ICD-10-CM

## 2018-05-18 PROCEDURE — 99213 OFFICE O/P EST LOW 20 MIN: CPT | Performed by: INTERNAL MEDICINE

## 2018-05-18 RX ORDER — HYDROCODONE BITARTRATE AND ACETAMINOPHEN 10; 325 MG/1; MG/1
1 TABLET ORAL 4 TIMES DAILY PRN
Qty: 120 TABLET | Refills: 0 | Status: SHIPPED | OUTPATIENT
Start: 2018-05-18 | End: 2018-06-19 | Stop reason: SDUPTHER

## 2018-05-18 RX ORDER — DEXTROAMPHETAMINE SACCHARATE, AMPHETAMINE ASPARTATE, DEXTROAMPHETAMINE SULFATE AND AMPHETAMINE SULFATE 3.75; 3.75; 3.75; 3.75 MG/1; MG/1; MG/1; MG/1
15 TABLET ORAL 3 TIMES DAILY
Qty: 90 TABLET | Refills: 0 | Status: SHIPPED | OUTPATIENT
Start: 2018-05-18 | End: 2018-08-17 | Stop reason: SDUPTHER

## 2018-05-18 NOTE — PROGRESS NOTES
Subjective   Bessy iM is a 32 y.o. female. Patient is here today for follow-up on her low back pain.  She's been stable on the medication.  She does complain of some fatigue but she is working second shift and not getting enough sleep.  Her ADD is been controlled well on her Adderall and she's having no medicine side effects she has no acute complaints  Chief Complaint   Patient presents with   • ADD     AND LOW BACK PAIN- NEEDS REFILL ON ADDERALL AND HYDROCODONE          Vitals:    05/18/18 1323   BP: 110/64   Pulse: 78   Temp: 98.3 °F (36.8 °C)   SpO2: 99%     The following portions of the patient's history were reviewed and updated as appropriate: allergies, current medications, past family history, past medical history, past social history, past surgical history and problem list.    Past Medical History:   Diagnosis Date   • ADHD (attention deficit hyperactivity disorder)    • Anxiety    • Arthritis    • Hypothyroidism    • Low back pain    • Migraine headache       No Known Allergies   Social History     Social History   • Marital status:      Spouse name: N/A   • Number of children: N/A   • Years of education: N/A     Occupational History   • Not on file.     Social History Main Topics   • Smoking status: Current Every Day Smoker   • Smokeless tobacco: Never Used   • Alcohol use Yes      Comment: Very Rare   • Drug use: No   • Sexual activity: Not on file     Other Topics Concern   • Not on file     Social History Narrative   • No narrative on file        Current Outpatient Prescriptions:   •  amphetamine-dextroamphetamine (ADDERALL) 15 MG tablet, Take 1 tablet by mouth 3 (Three) Times a Day., Disp: 90 tablet, Rfl: 0  •  HYDROcodone-acetaminophen (NORCO)  MG per tablet, Take 1 tablet by mouth 4 (Four) Times a Day As Needed (PAIN)., Disp: 120 tablet, Rfl: 0     Objective     History of Present Illness     Review of Systems   Constitutional: Negative.    HENT: Negative.    Eyes: Negative.     Respiratory: Negative.    Cardiovascular: Negative.    Gastrointestinal: Negative.    Endocrine: Negative.    Genitourinary: Negative.    Musculoskeletal: Positive for back pain.   Skin: Negative.    Neurological: Negative.    Psychiatric/Behavioral: Negative.        Physical Exam   Constitutional: She is oriented to person, place, and time. She appears well-developed and well-nourished.   Pleasant, cooperative no distress blood pressure 120/80   HENT:   Head: Normocephalic and atraumatic.   Eyes: Conjunctivae are normal. Pupils are equal, round, and reactive to light. No scleral icterus.   Neck: Normal range of motion. Neck supple.   Cardiovascular: Normal rate, regular rhythm and normal heart sounds.    Pulmonary/Chest: Effort normal and breath sounds normal. No respiratory distress. She has no wheezes. She has no rales.   Musculoskeletal: Normal range of motion.   Neurological: She is alert and oriented to person, place, and time.   Skin: Skin is warm and dry.   Psychiatric: She has a normal mood and affect. Her behavior is normal.   Nursing note and vitals reviewed.      ASSESSMENT  overall the patient stable.  She has chronic low back pain that is controlled by her pain medication.  Her ADD is under reasonable control with the Adderall.  Her Tan was reviewed and is appropriate.     Problem List Items Addressed This Visit        Nervous and Auditory    Low back pain - Primary          PLAN   I refilled the patient hydrocodone and Adderall.  I plan on rechecking her in 3 months with a CMP and lipid panel    There are no Patient Instructions on file for this visit.  Return in about 3 months (around 8/18/2018) for with labs.

## 2018-06-19 RX ORDER — HYDROCODONE BITARTRATE AND ACETAMINOPHEN 10; 325 MG/1; MG/1
1 TABLET ORAL 4 TIMES DAILY PRN
Qty: 120 TABLET | Refills: 0 | Status: SHIPPED | OUTPATIENT
Start: 2018-06-19 | End: 2018-07-18 | Stop reason: SDUPTHER

## 2018-06-20 ENCOUNTER — TELEPHONE (OUTPATIENT)
Dept: FAMILY MEDICINE CLINIC | Facility: CLINIC | Age: 33
End: 2018-06-20

## 2018-06-20 NOTE — TELEPHONE ENCOUNTER
CALLED PT AND ADVISED SCRIPT IS READY FOR       ----- Message from Shereen Iverson MA sent at 6/20/2018  8:27 AM EDT -----  Contact: PT      ----- Message -----  From: Brooklyn Baugh MA  Sent: 6/18/2018   1:56 PM  To: Shereen Iverson MA    PT NEEDS RX REFILL FOR HYDROcodone-acetaminophen (NORCO)  MG per tablet 4 times a day qty 120      Pt can be reached at [phone]

## 2018-07-18 RX ORDER — HYDROCODONE BITARTRATE AND ACETAMINOPHEN 10; 325 MG/1; MG/1
1 TABLET ORAL 4 TIMES DAILY PRN
Qty: 120 TABLET | Refills: 0 | Status: SHIPPED | OUTPATIENT
Start: 2018-07-18 | End: 2018-08-17 | Stop reason: SDUPTHER

## 2018-07-20 ENCOUNTER — TELEPHONE (OUTPATIENT)
Dept: FAMILY MEDICINE CLINIC | Facility: CLINIC | Age: 33
End: 2018-07-20

## 2018-07-31 DIAGNOSIS — E78.5 HYPERLIPIDEMIA, UNSPECIFIED HYPERLIPIDEMIA TYPE: Primary | ICD-10-CM

## 2018-08-08 LAB
ALBUMIN SERPL-MCNC: 4 G/DL (ref 3.5–5.2)
ALBUMIN/GLOB SERPL: 1.8 G/DL
ALP SERPL-CCNC: 51 U/L (ref 39–117)
ALT SERPL-CCNC: 24 U/L (ref 1–33)
AST SERPL-CCNC: 22 U/L (ref 1–32)
BILIRUB SERPL-MCNC: 0.5 MG/DL (ref 0.1–1.2)
BUN SERPL-MCNC: 13 MG/DL (ref 6–20)
BUN/CREAT SERPL: 14.9 (ref 7–25)
CALCIUM SERPL-MCNC: 9.1 MG/DL (ref 8.6–10.5)
CHLORIDE SERPL-SCNC: 104 MMOL/L (ref 98–107)
CHOLEST SERPL-MCNC: 183 MG/DL (ref 0–200)
CO2 SERPL-SCNC: 25.5 MMOL/L (ref 22–29)
CREAT SERPL-MCNC: 0.87 MG/DL (ref 0.57–1)
GLOBULIN SER CALC-MCNC: 2.2 GM/DL
GLUCOSE SERPL-MCNC: 81 MG/DL (ref 65–99)
HDLC SERPL-MCNC: 68 MG/DL (ref 40–60)
LDLC SERPL CALC-MCNC: 104 MG/DL (ref 0–100)
LDLC/HDLC SERPL: 1.53 {RATIO}
POTASSIUM SERPL-SCNC: 4.8 MMOL/L (ref 3.5–5.2)
PROT SERPL-MCNC: 6.2 G/DL (ref 6–8.5)
SODIUM SERPL-SCNC: 141 MMOL/L (ref 136–145)
TRIGL SERPL-MCNC: 56 MG/DL (ref 0–150)
VLDLC SERPL CALC-MCNC: 11.2 MG/DL (ref 5–40)

## 2018-08-17 ENCOUNTER — OFFICE VISIT (OUTPATIENT)
Dept: FAMILY MEDICINE CLINIC | Facility: CLINIC | Age: 33
End: 2018-08-17

## 2018-08-17 VITALS
DIASTOLIC BLOOD PRESSURE: 78 MMHG | SYSTOLIC BLOOD PRESSURE: 110 MMHG | OXYGEN SATURATION: 99 % | HEART RATE: 63 BPM | TEMPERATURE: 98.3 F | WEIGHT: 209.8 LBS | HEIGHT: 66 IN | BODY MASS INDEX: 33.72 KG/M2

## 2018-08-17 DIAGNOSIS — G89.29 CHRONIC LOW BACK PAIN WITHOUT SCIATICA, UNSPECIFIED BACK PAIN LATERALITY: ICD-10-CM

## 2018-08-17 DIAGNOSIS — M54.50 CHRONIC LOW BACK PAIN WITHOUT SCIATICA, UNSPECIFIED BACK PAIN LATERALITY: ICD-10-CM

## 2018-08-17 DIAGNOSIS — E78.5 HYPERLIPIDEMIA, UNSPECIFIED HYPERLIPIDEMIA TYPE: Primary | ICD-10-CM

## 2018-08-17 DIAGNOSIS — F90.0 ATTENTION DEFICIT HYPERACTIVITY DISORDER (ADHD), PREDOMINANTLY INATTENTIVE TYPE: ICD-10-CM

## 2018-08-17 PROCEDURE — 99213 OFFICE O/P EST LOW 20 MIN: CPT | Performed by: INTERNAL MEDICINE

## 2018-08-17 RX ORDER — DEXTROAMPHETAMINE SACCHARATE, AMPHETAMINE ASPARTATE, DEXTROAMPHETAMINE SULFATE AND AMPHETAMINE SULFATE 3.75; 3.75; 3.75; 3.75 MG/1; MG/1; MG/1; MG/1
15 TABLET ORAL DAILY
Qty: 90 TABLET | Refills: 0 | Status: SHIPPED | OUTPATIENT
Start: 2018-08-17 | End: 2019-02-19

## 2018-08-17 RX ORDER — HYDROCODONE BITARTRATE AND ACETAMINOPHEN 10; 325 MG/1; MG/1
1 TABLET ORAL 4 TIMES DAILY PRN
Qty: 120 TABLET | Refills: 0 | Status: SHIPPED | OUTPATIENT
Start: 2018-08-17 | End: 2018-09-18 | Stop reason: SDUPTHER

## 2018-08-17 NOTE — PROGRESS NOTES
Subjective   Bessy Mi is a 33 y.o. female. Patient is here today for follow-up on her hyperlipidemia, low back pain and ADD patient's generally been feeling fine.  Her job is physical and she does have continuing back pain that's controlled on medication and she needs a refill.  Her ADD has been controlled and she's been gradually decreasing the dose and has down to one 15 milligrams tablet, taking half in the morning and half later in the day..  Chief Complaint   Patient presents with   • Hyperlipidemia     LOW BACK PAIN, ADD- FOLLOW UP LABS AND MED CHECK-           Vitals:    08/17/18 1352   BP: 110/78   Pulse: 63   Temp: 98.3 °F (36.8 °C)   SpO2: 99%     The following portions of the patient's history were reviewed and updated as appropriate: allergies, current medications, past family history, past medical history, past social history, past surgical history and problem list.    Past Medical History:   Diagnosis Date   • ADHD (attention deficit hyperactivity disorder)    • Anxiety    • Arthritis    • Hypothyroidism    • Low back pain    • Migraine headache       No Known Allergies   Social History     Social History   • Marital status:      Spouse name: N/A   • Number of children: N/A   • Years of education: N/A     Occupational History   • Not on file.     Social History Main Topics   • Smoking status: Current Every Day Smoker   • Smokeless tobacco: Never Used   • Alcohol use Yes      Comment: Very Rare   • Drug use: No   • Sexual activity: Not on file     Other Topics Concern   • Not on file     Social History Narrative   • No narrative on file        Current Outpatient Prescriptions:   •  amphetamine-dextroamphetamine (ADDERALL) 15 MG tablet, Take 1 tablet by mouth Daily., Disp: 90 tablet, Rfl: 0  •  HYDROcodone-acetaminophen (NORCO)  MG per tablet, Take 1 tablet by mouth 4 (Four) Times a Day As Needed (PAIN)., Disp: 120 tablet, Rfl: 0     Objective     History of Present Illness     Review  of Systems   Constitutional: Negative.    HENT: Negative.    Eyes: Negative.    Respiratory: Negative.    Cardiovascular: Negative.    Gastrointestinal: Negative.    Genitourinary: Negative.    Musculoskeletal: Negative.    Skin: Negative.    Neurological: Negative.    Psychiatric/Behavioral: Negative.        Physical Exam   Constitutional: She is oriented to person, place, and time. She appears well-developed and well-nourished.   Pleasant, cooperative no distress blood pressure 120/80   HENT:   Head: Normocephalic and atraumatic.   Eyes: Pupils are equal, round, and reactive to light. Conjunctivae are normal. No scleral icterus.   Neck: Normal range of motion. Neck supple.   Cardiovascular: Normal rate, regular rhythm and normal heart sounds.    Pulmonary/Chest: Effort normal and breath sounds normal. No respiratory distress. She has no wheezes. She has no rales.   Musculoskeletal: Normal range of motion. She exhibits no edema.   Neurological: She is alert and oriented to person, place, and time.   Skin: Skin is warm and dry.   Psychiatric: She has a normal mood and affect. Her behavior is normal.   Nursing note and vitals reviewed.      ASSESSMENT  CMP is normal.  Her lipid panel is improved with a total cholesterol 183, HDL 68, .  #1-hyperlipidemia, will continue with diet control  #2-ADD, controlled and the patient gradually decreasing the dose, currently on 15 mg tablet daily       Problem List Items Addressed This Visit        Cardiovascular and Mediastinum    Hyperlipidemia - Primary       Other    Attention deficit disorder          PLAN  I refilled the patient's hydrocodone.  She will continue current medicines as now and I will recheck her in 3 months but will not need any laboratory testing    There are no Patient Instructions on file for this visit.  No Follow-up on file.

## 2018-09-18 ENCOUNTER — TELEPHONE (OUTPATIENT)
Dept: FAMILY MEDICINE CLINIC | Facility: CLINIC | Age: 33
End: 2018-09-18

## 2018-09-18 RX ORDER — HYDROCODONE BITARTRATE AND ACETAMINOPHEN 10; 325 MG/1; MG/1
1 TABLET ORAL 4 TIMES DAILY PRN
Qty: 120 TABLET | Refills: 0 | Status: SHIPPED | OUTPATIENT
Start: 2018-09-18 | End: 2018-10-16 | Stop reason: SDUPTHER

## 2018-09-18 NOTE — TELEPHONE ENCOUNTER
PRINTED AND READY FOR  CALLED PT AND LEFT VM     ----- Message from Coretta Morales sent at 9/17/2018 12:17 PM EDT -----  NEEDS A RX FOR     HYDROCODONE 10/325 # 4 TIMES A DAY     PLEASE CALL WHEN PT CAN PU     [phone]

## 2018-10-16 RX ORDER — HYDROCODONE BITARTRATE AND ACETAMINOPHEN 10; 325 MG/1; MG/1
1 TABLET ORAL 4 TIMES DAILY PRN
Qty: 120 TABLET | Refills: 0 | Status: SHIPPED | OUTPATIENT
Start: 2018-10-16 | End: 2018-11-14 | Stop reason: SDUPTHER

## 2018-10-17 ENCOUNTER — TELEPHONE (OUTPATIENT)
Dept: FAMILY MEDICINE CLINIC | Facility: CLINIC | Age: 33
End: 2018-10-17

## 2018-10-17 NOTE — TELEPHONE ENCOUNTER
PRINTED AND SIGNED LEFT VM TO        ----- Message from Coretta Moraels sent at 10/16/2018 10:33 AM EDT -----  NEEDS A RX FOR     [place] 10.325 # 120       PLEASE CALL WHEN READY TO      [phone]

## 2018-11-15 RX ORDER — HYDROCODONE BITARTRATE AND ACETAMINOPHEN 10; 325 MG/1; MG/1
1 TABLET ORAL 4 TIMES DAILY PRN
Qty: 120 TABLET | Refills: 0 | Status: SHIPPED | OUTPATIENT
Start: 2018-11-15 | End: 2018-12-19 | Stop reason: SDUPTHER

## 2018-12-17 ENCOUNTER — TELEPHONE (OUTPATIENT)
Dept: FAMILY MEDICINE CLINIC | Facility: CLINIC | Age: 33
End: 2018-12-17

## 2018-12-19 RX ORDER — HYDROCODONE BITARTRATE AND ACETAMINOPHEN 10; 325 MG/1; MG/1
1 TABLET ORAL 4 TIMES DAILY PRN
Qty: 120 TABLET | Refills: 0 | Status: SHIPPED | OUTPATIENT
Start: 2018-12-19 | End: 2019-01-11 | Stop reason: SDUPTHER

## 2019-01-11 RX ORDER — HYDROCODONE BITARTRATE AND ACETAMINOPHEN 10; 325 MG/1; MG/1
1 TABLET ORAL 4 TIMES DAILY PRN
Qty: 120 TABLET | Refills: 0 | Status: SHIPPED | OUTPATIENT
Start: 2019-01-11 | End: 2019-02-19 | Stop reason: SDUPTHER

## 2019-02-19 ENCOUNTER — OFFICE VISIT (OUTPATIENT)
Dept: FAMILY MEDICINE CLINIC | Facility: CLINIC | Age: 34
End: 2019-02-19

## 2019-02-19 VITALS
OXYGEN SATURATION: 97 % | DIASTOLIC BLOOD PRESSURE: 70 MMHG | HEIGHT: 65 IN | WEIGHT: 210 LBS | SYSTOLIC BLOOD PRESSURE: 110 MMHG | HEART RATE: 72 BPM | RESPIRATION RATE: 16 BRPM | BODY MASS INDEX: 34.99 KG/M2

## 2019-02-19 DIAGNOSIS — M54.50 CHRONIC LOW BACK PAIN WITHOUT SCIATICA, UNSPECIFIED BACK PAIN LATERALITY: Primary | ICD-10-CM

## 2019-02-19 DIAGNOSIS — G89.29 CHRONIC LOW BACK PAIN WITHOUT SCIATICA, UNSPECIFIED BACK PAIN LATERALITY: Primary | ICD-10-CM

## 2019-02-19 PROCEDURE — 99213 OFFICE O/P EST LOW 20 MIN: CPT | Performed by: INTERNAL MEDICINE

## 2019-02-19 RX ORDER — HYDROCODONE BITARTRATE AND ACETAMINOPHEN 10; 325 MG/1; MG/1
1 TABLET ORAL 4 TIMES DAILY PRN
Qty: 120 TABLET | Refills: 0 | Status: SHIPPED | OUTPATIENT
Start: 2019-02-19 | End: 2019-03-18 | Stop reason: SDUPTHER

## 2019-02-19 NOTE — PROGRESS NOTES
Subjective   Bessy Mi is a 33 y.o. female. Patient is here today for follow-up on her chronic low back pain.  She is generally been stable has no new acute complaints.  The hydrocodone is helpful with her back pain and she continues to work.  Chief Complaint   Patient presents with   • Back Pain     medication refill ara done          Vitals:    02/19/19 0942   BP: 110/70   Pulse: 72   Resp: 16   SpO2: 97%     The following portions of the patient's history were reviewed and updated as appropriate: allergies, current medications, past family history, past medical history, past social history, past surgical history and problem list.    Past Medical History:   Diagnosis Date   • ADHD (attention deficit hyperactivity disorder)    • Anxiety    • Arthritis    • Hypothyroidism    • Low back pain    • Migraine headache       No Known Allergies   Social History     Socioeconomic History   • Marital status:      Spouse name: Not on file   • Number of children: Not on file   • Years of education: Not on file   • Highest education level: Not on file   Social Needs   • Financial resource strain: Not on file   • Food insecurity - worry: Not on file   • Food insecurity - inability: Not on file   • Transportation needs - medical: Not on file   • Transportation needs - non-medical: Not on file   Occupational History   • Not on file   Tobacco Use   • Smoking status: Current Every Day Smoker   • Smokeless tobacco: Never Used   Substance and Sexual Activity   • Alcohol use: Yes     Comment: Very Rare   • Drug use: No   • Sexual activity: Not on file   Other Topics Concern   • Not on file   Social History Narrative   • Not on file        Current Outpatient Medications:   •  HYDROcodone-acetaminophen (NORCO)  MG per tablet, Take 1 tablet by mouth 4 (Four) Times a Day As Needed (PAIN)., Disp: 120 tablet, Rfl: 0     Objective     History of Present Illness     Review of Systems   Constitutional: Negative.    Eyes:  Negative.    Respiratory: Negative.    Cardiovascular: Negative.    Gastrointestinal: Negative.    Genitourinary: Negative.    Musculoskeletal: Negative.    Skin: Negative.    Neurological: Negative.    Psychiatric/Behavioral: Negative.        Physical Exam   Constitutional: She is oriented to person, place, and time. She appears well-developed and well-nourished.   Pleasant, cooperative no distress, blood pressure 130/80   HENT:   Head: Normocephalic and atraumatic.   Eyes: Conjunctivae are normal. Pupils are equal, round, and reactive to light. No scleral icterus.   Neck: Normal range of motion. Neck supple.   Cardiovascular: Normal rate, regular rhythm and normal heart sounds.   Pulmonary/Chest: Effort normal and breath sounds normal. No respiratory distress. She has no wheezes. She has no rales.   Musculoskeletal: Normal range of motion. She exhibits no edema.   Neurological: She is alert and oriented to person, place, and time.   Skin: Skin is warm and dry.   Psychiatric: She has a normal mood and affect. Her behavior is normal.   Nursing note and vitals reviewed.      ASSESSMENT overall the patient seems stable with a good blood pressure and heart and lungs are fine.  Her back pain is chronic but stable and controlled on the hydrocodone.  Her Tan was reviewed and is appropriate.     Problem List Items Addressed This Visit        Nervous and Auditory    Low back pain - Primary          PLAN I refilled the patient's hydrocodone.  I will plan on seeing her in 3 months for complete physical exam including a CBC, CMP, lipid panel, TSH, and urinalysis but no EKG or chest x-ray unless she is having symptoms.    There are no Patient Instructions on file for this visit.  Return in about 3 months (around 5/19/2019) for with labs, Annual physical.

## 2019-03-08 ENCOUNTER — TELEPHONE (OUTPATIENT)
Dept: FAMILY MEDICINE CLINIC | Facility: CLINIC | Age: 34
End: 2019-03-08

## 2019-03-08 RX ORDER — FLUCONAZOLE 150 MG/1
TABLET ORAL
Qty: 2 TABLET | Refills: 0 | Status: SHIPPED | OUTPATIENT
Start: 2019-03-08 | End: 2019-09-19

## 2019-03-08 NOTE — TELEPHONE ENCOUNTER
-----tried calling the number listed 3 times and never got a ringing phone rx was sent to pharmacy on file     Message from Javier Amador MD sent at 3/8/2019 11:00 AM EST -----  We can call in Diflucan 150 mg, 2 tablets, take 1 p.o. now and repeat in 3 days if needed      ----- Message -----  From: Darling Donahue MA  Sent: 3/8/2019  10:57 AM  To: Javier Amador MD     Please review and advise  ----- Message -----  From: Coretta Morales  Sent: 3/8/2019  10:04 AM  To: Darling Donahue MA    WANTING TO SEE IF A RX CAN BE CALLED IN FOR DIFLUCAN     SAYS HER OB IS OUT OF THE OFFICE FOR  2 WEEKS     THE PT IS WORKING 7 DAYS A WEEK       [facility] [place]      PLEASE CALL PT TO LET HER KNOW THIS WAS OR WAS NOT CALLED IN     [phone]  PLEASE LEAVE VOICEMAIL   PT WILL BE INSIDE WORK AND THE BUILDING GETS ZERO RECEPTION

## 2019-03-19 RX ORDER — HYDROCODONE BITARTRATE AND ACETAMINOPHEN 10; 325 MG/1; MG/1
1 TABLET ORAL 4 TIMES DAILY PRN
Qty: 120 TABLET | Refills: 0 | Status: SHIPPED | OUTPATIENT
Start: 2019-03-19 | End: 2019-04-17 | Stop reason: SDUPTHER

## 2019-04-17 RX ORDER — HYDROCODONE BITARTRATE AND ACETAMINOPHEN 10; 325 MG/1; MG/1
1 TABLET ORAL 4 TIMES DAILY PRN
Qty: 120 TABLET | Refills: 0 | Status: SHIPPED | OUTPATIENT
Start: 2019-04-17 | End: 2019-05-15 | Stop reason: SDUPTHER

## 2019-05-15 RX ORDER — HYDROCODONE BITARTRATE AND ACETAMINOPHEN 10; 325 MG/1; MG/1
1 TABLET ORAL 4 TIMES DAILY PRN
Qty: 120 TABLET | Refills: 0 | Status: SHIPPED | OUTPATIENT
Start: 2019-05-15 | End: 2019-05-17 | Stop reason: SDUPTHER

## 2019-05-17 RX ORDER — HYDROCODONE BITARTRATE AND ACETAMINOPHEN 10; 325 MG/1; MG/1
1 TABLET ORAL 4 TIMES DAILY PRN
Qty: 120 TABLET | Refills: 0 | Status: SHIPPED | OUTPATIENT
Start: 2019-05-17 | End: 2019-06-14 | Stop reason: SDUPTHER

## 2019-06-14 RX ORDER — HYDROCODONE BITARTRATE AND ACETAMINOPHEN 10; 325 MG/1; MG/1
1 TABLET ORAL 4 TIMES DAILY PRN
Qty: 120 TABLET | Refills: 0 | Status: SHIPPED | OUTPATIENT
Start: 2019-06-14 | End: 2019-07-15 | Stop reason: SDUPTHER

## 2019-07-16 RX ORDER — HYDROCODONE BITARTRATE AND ACETAMINOPHEN 10; 325 MG/1; MG/1
1 TABLET ORAL 4 TIMES DAILY PRN
Qty: 120 TABLET | Refills: 0 | Status: SHIPPED | OUTPATIENT
Start: 2019-07-16 | End: 2019-08-14 | Stop reason: SDUPTHER

## 2019-08-15 ENCOUNTER — TELEPHONE (OUTPATIENT)
Dept: FAMILY MEDICINE CLINIC | Facility: CLINIC | Age: 34
End: 2019-08-15

## 2019-08-15 RX ORDER — HYDROCODONE BITARTRATE AND ACETAMINOPHEN 10; 325 MG/1; MG/1
1 TABLET ORAL 4 TIMES DAILY PRN
Qty: 120 TABLET | Refills: 0 | Status: SHIPPED | OUTPATIENT
Start: 2019-08-15 | End: 2019-09-19 | Stop reason: SDUPTHER

## 2019-08-15 NOTE — TELEPHONE ENCOUNTER
CALLED PT ADVISED SCRIPT IS READY FOR .  ALSO ADVISED PT SHE IS DUE FOR AN APPT BEFORE NEXT REFILL     ----- Message from Shereen Iverson MA sent at 8/15/2019  2:41 PM EDT -----      ----- Message -----  From: Javier Amador MD  Sent: 8/14/2019  11:46 AM  To: Shereen Iverson MA    Its okay to fill it but she also needs an appointment and I do not know whether she is due any lab studies or not.      ----- Message -----  From: Shereen Iverson MA  Sent: 8/14/2019   9:35 AM  To: MD DR. DEWAYNE Rivera,     PATIENT IS REQUESTING A REFILL ON HER HYDROCODONE. SHE HAS NOT BEEN SEEN SINCE February 19TH. DOES SHE NEED AN APPOINTMENT OR IS IT OKAY TO FILL? PLEASE ADVISE. THANK YOU.       ----- Message -----  From: Tierney Garzon RegSched Rep  Sent: 8/14/2019   8:08 AM  To: Shereen Iverson MA    Pt needs script refill for HYDROcodone-acetaminophen (NORCO)  MG Take 1 tablet by mouth 4 (Four) Times a Day As Needed (PAIN) #120    Please contact pt when ready for  at [phone]

## 2019-09-19 ENCOUNTER — OFFICE VISIT (OUTPATIENT)
Dept: FAMILY MEDICINE CLINIC | Facility: CLINIC | Age: 34
End: 2019-09-19

## 2019-09-19 VITALS
RESPIRATION RATE: 18 BRPM | HEART RATE: 70 BPM | BODY MASS INDEX: 34.3 KG/M2 | WEIGHT: 213.4 LBS | OXYGEN SATURATION: 99 % | TEMPERATURE: 97.7 F | DIASTOLIC BLOOD PRESSURE: 74 MMHG | SYSTOLIC BLOOD PRESSURE: 112 MMHG | HEIGHT: 66 IN

## 2019-09-19 DIAGNOSIS — M54.50 CHRONIC LOW BACK PAIN WITHOUT SCIATICA, UNSPECIFIED BACK PAIN LATERALITY: Primary | ICD-10-CM

## 2019-09-19 DIAGNOSIS — G89.29 CHRONIC LOW BACK PAIN WITHOUT SCIATICA, UNSPECIFIED BACK PAIN LATERALITY: Primary | ICD-10-CM

## 2019-09-19 PROCEDURE — 99213 OFFICE O/P EST LOW 20 MIN: CPT | Performed by: INTERNAL MEDICINE

## 2019-09-19 RX ORDER — FLUCONAZOLE 150 MG/1
TABLET ORAL
Qty: 2 TABLET | Refills: 0 | OUTPATIENT
Start: 2019-09-19 | End: 2020-01-23

## 2019-09-19 RX ORDER — HYDROCODONE BITARTRATE AND ACETAMINOPHEN 10; 325 MG/1; MG/1
1 TABLET ORAL 4 TIMES DAILY PRN
Qty: 120 TABLET | Refills: 0 | Status: SHIPPED | OUTPATIENT
Start: 2019-09-19 | End: 2019-10-16 | Stop reason: SDUPTHER

## 2019-09-19 NOTE — PROGRESS NOTES
Subjective   Bessy Mi is a 34 y.o. female. Patient is here today for follow-up on her chronic low back pain.  She is using hydrocodone and needs a refill on it.  She is generally been stable and has no acute complaints.  Her back pain waxes and wanes but overall control is reasonable on medication  Chief Complaint   Patient presents with   • Back Pain     PT HERE FOR MED CHECK FOR HYDROCODONE          Vitals:    09/19/19 1355   BP: 112/74   Pulse: 70   Resp: 18   Temp: 97.7 °F (36.5 °C)   SpO2: 99%     The following portions of the patient's history were reviewed and updated as appropriate: allergies, current medications, past family history, past medical history, past social history, past surgical history and problem list.    Past Medical History:   Diagnosis Date   • ADHD (attention deficit hyperactivity disorder)    • Anxiety    • Arthritis    • Hypothyroidism    • Low back pain    • Migraine headache       No Known Allergies   Social History     Socioeconomic History   • Marital status:      Spouse name: Not on file   • Number of children: Not on file   • Years of education: Not on file   • Highest education level: Not on file   Tobacco Use   • Smoking status: Current Every Day Smoker   • Smokeless tobacco: Never Used   Substance and Sexual Activity   • Alcohol use: Yes     Comment: Very Rare   • Drug use: No        Current Outpatient Medications:   •  HYDROcodone-acetaminophen (NORCO)  MG per tablet, Take 1 tablet by mouth 4 (Four) Times a Day As Needed (PAIN)., Disp: 120 tablet, Rfl: 0  •  fluconazole (DIFLUCAN) 150 MG tablet, Take 1 by mouth now and repeat if needed in 3 days, Disp: 2 tablet, Rfl: 0     Objective     History of Present Illness     Review of Systems   Constitutional: Negative.    HENT: Negative.    Respiratory: Negative.    Cardiovascular: Negative.    Gastrointestinal: Negative.    Genitourinary: Negative.    Musculoskeletal: Positive for back pain.   Skin: Negative.     Neurological: Negative.    Psychiatric/Behavioral: Negative.        Physical Exam   Constitutional: She is oriented to person, place, and time. She appears well-developed and well-nourished.   Pleasant, cooperative no acute distress   HENT:   Head: Normocephalic and atraumatic.   Eyes: Conjunctivae are normal. Pupils are equal, round, and reactive to light. No scleral icterus.   Neck: Normal range of motion. Neck supple.   Cardiovascular: Normal rate, regular rhythm and normal heart sounds.   Pulmonary/Chest: Effort normal and breath sounds normal. No respiratory distress. She has no wheezes. She has no rales.   Musculoskeletal: Normal range of motion.   Neurological: She is alert and oriented to person, place, and time.   Skin: Skin is warm and dry.   Psychiatric: She has a normal mood and affect. Her behavior is normal.   Nursing note and vitals reviewed.      ASSESSMENT #1-chronic low back pain, controlled on medication  Patient's Tan was reviewed and is appropriate.     Problem List Items Addressed This Visit        Nervous and Auditory    Low back pain - Primary          PLAN the patient will get a flu shot at work.  I want her to check on her tetanus immunizations.  I refilled her hydrocodone and I want to recheck her in 4 months for complete physical exam including a CBC, CMP, lipid panel, TSH and free T4 because of fatigue and urinalysis but no chest x-ray or EKG    There are no Patient Instructions on file for this visit.  Return in about 4 months (around 1/19/2020) for Annual physical, with labs.

## 2019-10-16 ENCOUNTER — TELEPHONE (OUTPATIENT)
Dept: FAMILY MEDICINE CLINIC | Facility: CLINIC | Age: 34
End: 2019-10-16

## 2019-10-16 RX ORDER — HYDROCODONE BITARTRATE AND ACETAMINOPHEN 10; 325 MG/1; MG/1
1 TABLET ORAL 4 TIMES DAILY PRN
Qty: 120 TABLET | Refills: 0 | Status: SHIPPED | OUTPATIENT
Start: 2019-10-16 | End: 2019-11-20 | Stop reason: SDUPTHER

## 2019-10-16 NOTE — TELEPHONE ENCOUNTER
RX IS UP FRONT AND READY TO BE PICKED UP. PT IS AWARE.       ----- Message from Charo Sorensen sent at 10/15/2019 10:27 AM EDT -----  Contact: pt [phone]  Written Rx    Hydrocodone/ace 10/325   1 q 4 hrs pain    Call when ready

## 2019-11-18 ENCOUNTER — TELEPHONE (OUTPATIENT)
Dept: FAMILY MEDICINE CLINIC | Facility: CLINIC | Age: 34
End: 2019-11-18

## 2019-11-18 NOTE — TELEPHONE ENCOUNTER
PT NEEDS SCRIPT REFILL FOR     HYDROcodone-acetaminophen (NORCO)  MG Take 1 tablet by mouth 4 (Four) Times a Day As Needed (PAIN) #120    PLEASE SEND TO [facility] IN [facility] ON [address] OR CONTACT PT WHEN READY FOR  [phone]

## 2019-11-20 RX ORDER — HYDROCODONE BITARTRATE AND ACETAMINOPHEN 10; 325 MG/1; MG/1
1 TABLET ORAL 4 TIMES DAILY PRN
Qty: 120 TABLET | Refills: 0 | Status: SHIPPED | OUTPATIENT
Start: 2019-11-20 | End: 2019-12-17 | Stop reason: SDUPTHER

## 2019-11-20 RX ORDER — HYDROCODONE BITARTRATE AND ACETAMINOPHEN 10; 325 MG/1; MG/1
1 TABLET ORAL 4 TIMES DAILY PRN
Qty: 120 TABLET | Refills: 0 | OUTPATIENT
Start: 2019-11-20

## 2019-11-20 NOTE — TELEPHONE ENCOUNTER
RX HAS BEEN SENT TO THE PHARMACY FOR PATIENT. CALLED AND LEFT MESSAGE FOR PT THAT IT WAS SENT TO PHARMACY.

## 2019-12-17 ENCOUNTER — TELEPHONE (OUTPATIENT)
Dept: FAMILY MEDICINE CLINIC | Facility: CLINIC | Age: 34
End: 2019-12-17

## 2019-12-17 RX ORDER — HYDROCODONE BITARTRATE AND ACETAMINOPHEN 10; 325 MG/1; MG/1
1 TABLET ORAL 4 TIMES DAILY PRN
Qty: 120 TABLET | Refills: 0 | Status: SHIPPED | OUTPATIENT
Start: 2019-12-17 | End: 2020-01-17 | Stop reason: SDUPTHER

## 2019-12-17 NOTE — TELEPHONE ENCOUNTER
RX HAS BEEN SENT TO THE PHARMACY FOR PATIENT. CALLED AND LEFT MESSAGE FOR PT ADVISING HER OF THIS.     ----- Message from Nicolasa Keith sent at 12/16/2019  3:16 PM EST -----  REFILL ON    HYDROCODONE 10/325MG QTY: 120  Take 1 tablet by mouth 4 (Four) Times a Day As Needed (PAIN)    SENT TO: [facility] [place]    THANK YOU

## 2020-01-17 RX ORDER — HYDROCODONE BITARTRATE AND ACETAMINOPHEN 10; 325 MG/1; MG/1
1 TABLET ORAL 4 TIMES DAILY PRN
Qty: 120 TABLET | Refills: 0 | Status: SHIPPED | OUTPATIENT
Start: 2020-01-17 | End: 2020-02-17 | Stop reason: SDUPTHER

## 2020-01-17 NOTE — TELEPHONE ENCOUNTER
LAST OV 9/19/19  LAST DAVID 9/18/19  OK TO REFILL ?      ----- Message from Vira Perry sent at 1/17/2020  1:46 PM EST -----  HYDROcodone-acetaminophen (NORCO)  MG per tablet [[id number]]     Order Details   Dose: 1 tablet Route: Oral Frequency: 4 Times Daily PRN for PAIN  Dispense Quantity: 120 tablet Refills: 0 Fills remaining: --         Sig: Take 1 tablet by mouth 4 (Four) Times a Day As Needed (PAIN).      Pharmacy:  [id number] - [place] [address] AT [facility] [address] - [phone]  - [phone] FX    Call pt at [phone]    Thanks vira

## 2020-01-23 ENCOUNTER — APPOINTMENT (OUTPATIENT)
Dept: CT IMAGING | Facility: HOSPITAL | Age: 35
End: 2020-01-23

## 2020-01-23 ENCOUNTER — HOSPITAL ENCOUNTER (EMERGENCY)
Facility: HOSPITAL | Age: 35
Discharge: HOME | End: 2020-01-23
Attending: EMERGENCY MEDICINE | Admitting: EMERGENCY MEDICINE

## 2020-01-23 VITALS
OXYGEN SATURATION: 98 % | HEART RATE: 74 BPM | BODY MASS INDEX: 32.14 KG/M2 | SYSTOLIC BLOOD PRESSURE: 135 MMHG | TEMPERATURE: 98.8 F | WEIGHT: 200 LBS | RESPIRATION RATE: 16 BRPM | HEIGHT: 66 IN | DIASTOLIC BLOOD PRESSURE: 89 MMHG

## 2020-01-23 DIAGNOSIS — R10.31 RIGHT LOWER QUADRANT ABDOMINAL PAIN: Primary | ICD-10-CM

## 2020-01-23 DIAGNOSIS — R11.2 NON-INTRACTABLE VOMITING WITH NAUSEA, UNSPECIFIED VOMITING TYPE: ICD-10-CM

## 2020-01-23 LAB
ALBUMIN SERPL-MCNC: 3.7 G/DL (ref 3.5–5.2)
ALBUMIN/GLOB SERPL: 1.3 G/DL
ALP SERPL-CCNC: 44 U/L (ref 39–117)
ALT SERPL W P-5'-P-CCNC: 19 U/L (ref 1–33)
ANION GAP SERPL CALCULATED.3IONS-SCNC: 10.1 MMOL/L (ref 5–15)
AST SERPL-CCNC: 18 U/L (ref 1–32)
B-HCG UR QL: NEGATIVE
BASOPHILS # BLD AUTO: 0.01 10*3/MM3 (ref 0–0.2)
BASOPHILS NFR BLD AUTO: 0.2 % (ref 0–1.5)
BILIRUB SERPL-MCNC: 0.7 MG/DL (ref 0.2–1.2)
BILIRUB UR QL STRIP: NEGATIVE
BUN BLD-MCNC: 13 MG/DL (ref 6–20)
BUN/CREAT SERPL: 14.9 (ref 7–25)
CALCIUM SPEC-SCNC: 8.4 MG/DL (ref 8.6–10.5)
CHLORIDE SERPL-SCNC: 101 MMOL/L (ref 98–107)
CLARITY UR: CLEAR
CO2 SERPL-SCNC: 24.9 MMOL/L (ref 22–29)
COLOR UR: YELLOW
CREAT BLD-MCNC: 0.87 MG/DL (ref 0.57–1)
DEPRECATED RDW RBC AUTO: 47.9 FL (ref 37–54)
EOSINOPHIL # BLD AUTO: 0.03 10*3/MM3 (ref 0–0.4)
EOSINOPHIL NFR BLD AUTO: 0.5 % (ref 0.3–6.2)
ERYTHROCYTE [DISTWIDTH] IN BLOOD BY AUTOMATED COUNT: 12.6 % (ref 12.3–15.4)
GFR SERPL CREATININE-BSD FRML MDRD: 75 ML/MIN/1.73
GLOBULIN UR ELPH-MCNC: 2.9 GM/DL
GLUCOSE BLD-MCNC: 94 MG/DL (ref 65–99)
GLUCOSE UR STRIP-MCNC: NEGATIVE MG/DL
HCT VFR BLD AUTO: 41.2 % (ref 34–46.6)
HGB BLD-MCNC: 13.2 G/DL (ref 12–15.9)
HGB UR QL STRIP.AUTO: NEGATIVE
IMM GRANULOCYTES # BLD AUTO: 0.01 10*3/MM3 (ref 0–0.05)
IMM GRANULOCYTES NFR BLD AUTO: 0.2 % (ref 0–0.5)
KETONES UR QL STRIP: NEGATIVE
LEUKOCYTE ESTERASE UR QL STRIP.AUTO: NEGATIVE
LIPASE SERPL-CCNC: 17 U/L (ref 13–60)
LYMPHOCYTES # BLD AUTO: 2.29 10*3/MM3 (ref 0.7–3.1)
LYMPHOCYTES NFR BLD AUTO: 36.2 % (ref 19.6–45.3)
MCH RBC QN AUTO: 32.4 PG (ref 26.6–33)
MCHC RBC AUTO-ENTMCNC: 32 G/DL (ref 31.5–35.7)
MCV RBC AUTO: 101 FL (ref 79–97)
MONOCYTES # BLD AUTO: 0.33 10*3/MM3 (ref 0.1–0.9)
MONOCYTES NFR BLD AUTO: 5.2 % (ref 5–12)
NEUTROPHILS # BLD AUTO: 3.66 10*3/MM3 (ref 1.7–7)
NEUTROPHILS NFR BLD AUTO: 57.7 % (ref 42.7–76)
NITRITE UR QL STRIP: NEGATIVE
PH UR STRIP.AUTO: 7 [PH] (ref 4.5–8)
PLATELET # BLD AUTO: 206 10*3/MM3 (ref 140–450)
PMV BLD AUTO: 9.9 FL (ref 6–12)
POTASSIUM BLD-SCNC: 4.3 MMOL/L (ref 3.5–5.2)
PROT SERPL-MCNC: 6.6 G/DL (ref 6–8.5)
PROT UR QL STRIP: NEGATIVE
RBC # BLD AUTO: 4.08 10*6/MM3 (ref 3.77–5.28)
SODIUM BLD-SCNC: 136 MMOL/L (ref 136–145)
SP GR UR STRIP: 1.02 (ref 1–1.03)
UROBILINOGEN UR QL STRIP: NORMAL
WBC NRBC COR # BLD: 6.33 10*3/MM3 (ref 3.4–10.8)

## 2020-01-23 PROCEDURE — 81003 URINALYSIS AUTO W/O SCOPE: CPT | Performed by: EMERGENCY MEDICINE

## 2020-01-23 PROCEDURE — 81025 URINE PREGNANCY TEST: CPT | Performed by: EMERGENCY MEDICINE

## 2020-01-23 PROCEDURE — 96375 TX/PRO/DX INJ NEW DRUG ADDON: CPT

## 2020-01-23 PROCEDURE — 85025 COMPLETE CBC W/AUTO DIFF WBC: CPT | Performed by: EMERGENCY MEDICINE

## 2020-01-23 PROCEDURE — 83690 ASSAY OF LIPASE: CPT | Performed by: EMERGENCY MEDICINE

## 2020-01-23 PROCEDURE — 74177 CT ABD & PELVIS W/CONTRAST: CPT

## 2020-01-23 PROCEDURE — 80053 COMPREHEN METABOLIC PANEL: CPT | Performed by: EMERGENCY MEDICINE

## 2020-01-23 PROCEDURE — 25010000002 ONDANSETRON PER 1 MG: Performed by: EMERGENCY MEDICINE

## 2020-01-23 PROCEDURE — 25010000002 MORPHINE PER 10 MG: Performed by: EMERGENCY MEDICINE

## 2020-01-23 PROCEDURE — 99284 EMERGENCY DEPT VISIT MOD MDM: CPT | Performed by: EMERGENCY MEDICINE

## 2020-01-23 PROCEDURE — 0 IOPAMIDOL PER 1 ML: Performed by: EMERGENCY MEDICINE

## 2020-01-23 PROCEDURE — 96374 THER/PROPH/DIAG INJ IV PUSH: CPT

## 2020-01-23 PROCEDURE — 99283 EMERGENCY DEPT VISIT LOW MDM: CPT

## 2020-01-23 RX ORDER — HYDROCODONE BITARTRATE AND ACETAMINOPHEN 5; 325 MG/1; MG/1
1 TABLET ORAL ONCE
Status: COMPLETED | OUTPATIENT
Start: 2020-01-23 | End: 2020-01-23

## 2020-01-23 RX ORDER — ONDANSETRON 2 MG/ML
4 INJECTION INTRAMUSCULAR; INTRAVENOUS ONCE
Status: COMPLETED | OUTPATIENT
Start: 2020-01-23 | End: 2020-01-23

## 2020-01-23 RX ORDER — SODIUM CHLORIDE 0.9 % (FLUSH) 0.9 %
10 SYRINGE (ML) INJECTION AS NEEDED
Status: DISCONTINUED | OUTPATIENT
Start: 2020-01-23 | End: 2020-01-23 | Stop reason: HOSPADM

## 2020-01-23 RX ORDER — DICYCLOMINE HCL 20 MG
20 TABLET ORAL EVERY 8 HOURS PRN
Qty: 20 TABLET | Refills: 0 | Status: SHIPPED | OUTPATIENT
Start: 2020-01-23 | End: 2020-05-21

## 2020-01-23 RX ADMIN — IOPAMIDOL 100 ML: 755 INJECTION, SOLUTION INTRAVENOUS at 14:19

## 2020-01-23 RX ADMIN — ONDANSETRON 4 MG: 2 INJECTION, SOLUTION INTRAMUSCULAR; INTRAVENOUS at 13:34

## 2020-01-23 RX ADMIN — HYDROCODONE BITARTRATE AND ACETAMINOPHEN 1 TABLET: 5; 325 TABLET ORAL at 15:14

## 2020-01-23 RX ADMIN — MORPHINE SULFATE 4 MG: 4 INJECTION INTRAVENOUS at 13:35

## 2020-01-23 NOTE — DISCHARGE INSTRUCTIONS
Gentle diet and plenty of fluids as tolerated.  Rest when needed.  Please return to the emergency room immediately for any worsening pain, fevers, nausea, vomiting, weakness or any other concerns.  Please return if your symptoms are not improving within the next 24 to 48 hours as we discussed.

## 2020-01-23 NOTE — ED PROVIDER NOTES
Subjective   History of Present Illness  History of Present Illness    Chief complaint: Abdominal pain    Location: Right lower quadrant    Quality/Severity: Moderate sharp pain    Timing/Duration: Began yesterday evening, continues through the night and into this morning    Modifying Factors: Worse with any activities.  Improved with lying on her side and putting pressure in the area    Narrative: This patient presents for evaluation of right lower quadrant abdominal pain problem.  She says the pain began in this area yesterday evening and has persisted through the night and into today.  It seems to be increasing in pain.  It is worse whenever she is ambulating or up and about doing activities.  She had been constipated earlier this week.  She did have some mild diarrhea after that but no blood in the stools.  She did develop some nausea and vomiting with clear vomitus today but she associates that with the pain that she is experiencing.  She has not had any fevers.  She is never had any abdominal surgeries.  She does have a history of endometriosis and ovarian cysts in the past.    Associated Symptoms: As above    Review of Systems   Constitutional: Positive for appetite change. Negative for activity change, diaphoresis and fever.   HENT: Negative.    Respiratory: Negative for cough and shortness of breath.    Cardiovascular: Negative for chest pain.   Gastrointestinal: Positive for abdominal pain, nausea and vomiting. Negative for blood in stool.   Genitourinary: Negative for dysuria, flank pain and hematuria.   Skin: Negative for color change, rash and wound.   Neurological: Negative for syncope and headaches.   All other systems reviewed and are negative.      Past Medical History:   Diagnosis Date   • ADHD (attention deficit hyperactivity disorder)    • Anxiety    • Arthritis    • Hypothyroidism    • Low back pain    • Migraine headache        No Known Allergies    Past Surgical History:   Procedure Laterality  Date   • WISDOM TOOTH EXTRACTION         Family History   Problem Relation Age of Onset   • Fibromyalgia Mother    • Hypertension Mother    • Lupus Mother    • Hypertension Father        Social History     Socioeconomic History   • Marital status:      Spouse name: Not on file   • Number of children: Not on file   • Years of education: Not on file   • Highest education level: Not on file   Tobacco Use   • Smoking status: Current Every Day Smoker   • Smokeless tobacco: Never Used   Substance and Sexual Activity   • Alcohol use: Yes     Comment: Very Rare   • Drug use: No       ED Triage Vitals   Temp Heart Rate Resp BP SpO2   01/23/20 1313 01/23/20 1313 01/23/20 1313 01/23/20 1320 01/23/20 1313   99.3 °F (37.4 °C) 87 15 135/88 98 %      Temp src Heart Rate Source Patient Position BP Location FiO2 (%)   01/23/20 1313 01/23/20 1313 01/23/20 1313 01/23/20 1313 --   Tympanic Monitor Lying Right arm          Objective   Physical Exam   Constitutional: She is oriented to person, place, and time. She appears well-developed and well-nourished.  Non-toxic appearance.   HENT:   Head: Normocephalic and atraumatic.   Eyes: Pupils are equal, round, and reactive to light. EOM are normal. Right eye exhibits no discharge. Left eye exhibits no discharge.   Neck: Normal range of motion. Neck supple.   Cardiovascular: Normal rate, regular rhythm and normal heart sounds.   No murmur heard.  Pulmonary/Chest: Effort normal and breath sounds normal. No stridor. No respiratory distress. She has no rales.   Abdominal: Soft. Normal appearance. There is tenderness in the right lower quadrant. There is guarding and tenderness at McBurney's point. There is no rigidity and no rebound. No hernia.   Musculoskeletal: Normal range of motion. She exhibits no edema or deformity.   Neurological: She is alert and oriented to person, place, and time.   Skin: Skin is warm and dry. No rash noted. She is not diaphoretic. No erythema.   Psychiatric:  She has a normal mood and affect. Her behavior is normal. Judgment and thought content normal.   Nursing note and vitals reviewed.    Results for orders placed or performed during the hospital encounter of 01/23/20   Urinalysis With Culture If Indicated - Urine, Clean Catch   Result Value Ref Range    Color, UA Yellow Yellow, Straw    Appearance, UA Clear Clear    pH, UA 7.0 4.5 - 8.0    Specific Gravity, UA 1.020 1.003 - 1.030    Glucose, UA Negative Negative    Ketones, UA Negative Negative    Bilirubin, UA Negative Negative    Blood, UA Negative Negative    Protein, UA Negative Negative    Leuk Esterase, UA Negative Negative    Nitrite, UA Negative Negative    Urobilinogen, UA 0.2 E.U./dL 0.2 - 1.0 E.U./dL   Comprehensive Metabolic Panel   Result Value Ref Range    Glucose 94 65 - 99 mg/dL    BUN 13 6 - 20 mg/dL    Creatinine 0.87 0.57 - 1.00 mg/dL    Sodium 136 136 - 145 mmol/L    Potassium 4.3 3.5 - 5.2 mmol/L    Chloride 101 98 - 107 mmol/L    CO2 24.9 22.0 - 29.0 mmol/L    Calcium 8.4 (L) 8.6 - 10.5 mg/dL    Total Protein 6.6 6.0 - 8.5 g/dL    Albumin 3.70 3.50 - 5.20 g/dL    ALT (SGPT) 19 1 - 33 U/L    AST (SGOT) 18 1 - 32 U/L    Alkaline Phosphatase 44 39 - 117 U/L    Total Bilirubin 0.7 0.2 - 1.2 mg/dL    eGFR Non African Amer 75 >60 mL/min/1.73    Globulin 2.9 gm/dL    A/G Ratio 1.3 g/dL    BUN/Creatinine Ratio 14.9 7.0 - 25.0    Anion Gap 10.1 5.0 - 15.0 mmol/L   Lipase   Result Value Ref Range    Lipase 17 13 - 60 U/L   Pregnancy, Urine - Urine, Clean Catch   Result Value Ref Range    HCG, Urine QL Negative Negative   CBC Auto Differential   Result Value Ref Range    WBC 6.33 3.40 - 10.80 10*3/mm3    RBC 4.08 3.77 - 5.28 10*6/mm3    Hemoglobin 13.2 12.0 - 15.9 g/dL    Hematocrit 41.2 34.0 - 46.6 %    .0 (H) 79.0 - 97.0 fL    MCH 32.4 26.6 - 33.0 pg    MCHC 32.0 31.5 - 35.7 g/dL    RDW 12.6 12.3 - 15.4 %    RDW-SD 47.9 37.0 - 54.0 fl    MPV 9.9 6.0 - 12.0 fL    Platelets 206 140 - 450 10*3/mm3     "Neutrophil % 57.7 42.7 - 76.0 %    Lymphocyte % 36.2 19.6 - 45.3 %    Monocyte % 5.2 5.0 - 12.0 %    Eosinophil % 0.5 0.3 - 6.2 %    Basophil % 0.2 0.0 - 1.5 %    Immature Grans % 0.2 0.0 - 0.5 %    Neutrophils, Absolute 3.66 1.70 - 7.00 10*3/mm3    Lymphocytes, Absolute 2.29 0.70 - 3.10 10*3/mm3    Monocytes, Absolute 0.33 0.10 - 0.90 10*3/mm3    Eosinophils, Absolute 0.03 0.00 - 0.40 10*3/mm3    Basophils, Absolute 0.01 0.00 - 0.20 10*3/mm3    Immature Grans, Absolute 0.01 0.00 - 0.05 10*3/mm3         RADIOLOGY        Study: CT scan abdomen and pelvis with IV contrast    Findings: No acute abnormality within the abdomen or pelvis.  Normal appendix.    Interpreted contemporaneously with treatment by Dr. Shay, independently viewed by me        Procedures           ED Course  ED Course as of Jan 23 1513   Thu Jan 23, 2020 1510 I have reviewed the labs and CT scan results from today's visit.  All findings are actually quite reassuring here.  Patient's vital signs are within normal limits.  It does not seem that there are any concerns for acute appendicitis or any other acute surgical emergency condition.  Patient has no talking about her concerns about persistent heartburn symptoms for many weeks.  Given the some of her presentation here, I think it is reasonable to discharge her home in good condition at this time.  I will give her contact information to call the gastroenterology clinic today and schedule a follow-up appointment.  I thoroughly reviewed with her the usual \"return to ER\" instructions for any worsening abdominal pain symptoms.  I advised her to return here if her pain is not improving within the next 24 to 48 hours.  She agreed to this plan as outlined.  Will discharge home with a prescription for Bentyl.    [NAV]      ED Course User Index  [NAV] Cipriano Martinez MD                                               MDM  Number of Diagnoses or Management Options  Non-intractable vomiting with nausea, " unspecified vomiting type:   Right lower quadrant abdominal pain:      Amount and/or Complexity of Data Reviewed  Clinical lab tests: ordered and reviewed  Tests in the radiology section of CPT®: ordered and reviewed  Independent visualization of images, tracings, or specimens: yes    Risk of Complications, Morbidity, and/or Mortality  Presenting problems: moderate  Diagnostic procedures: moderate  Management options: moderate        Final diagnoses:   Right lower quadrant abdominal pain   Non-intractable vomiting with nausea, unspecified vomiting type            Cipriano Martinez MD  01/23/20 1515

## 2020-01-24 DIAGNOSIS — R53.83 FATIGUE, UNSPECIFIED TYPE: ICD-10-CM

## 2020-01-24 DIAGNOSIS — Z00.00 ROUTINE HEALTH MAINTENANCE: Primary | ICD-10-CM

## 2020-01-24 DIAGNOSIS — F41.9 ANXIETY: ICD-10-CM

## 2020-02-14 ENCOUNTER — TELEPHONE (OUTPATIENT)
Dept: FAMILY MEDICINE CLINIC | Facility: CLINIC | Age: 35
End: 2020-02-14

## 2020-02-14 NOTE — TELEPHONE ENCOUNTER
PT NEEDS NEW RX SENT TO     [place] [facility] [id number]    HYDROCODONE-ACETAM  1 TAB 4 TIMES PER DAY #120    PT'S # [phone]

## 2020-02-18 RX ORDER — HYDROCODONE BITARTRATE AND ACETAMINOPHEN 10; 325 MG/1; MG/1
1 TABLET ORAL 4 TIMES DAILY PRN
Qty: 120 TABLET | Refills: 0 | Status: SHIPPED | OUTPATIENT
Start: 2020-02-18 | End: 2020-05-21

## 2020-03-13 ENCOUNTER — TELEPHONE (OUTPATIENT)
Dept: FAMILY MEDICINE CLINIC | Facility: CLINIC | Age: 35
End: 2020-03-13

## 2020-03-13 NOTE — TELEPHONE ENCOUNTER
Patient says we cancelled her apt for this month because we got information that she has Passport ins- Pt says her kids have it but she does not- says she cannot get in with anyone else because of the Corona virus so she wants us to refill.    Wants refill    HYDROcodone-acetaminophen (NORCO)  MG per tablet qid      Trever horn    Pt [phone]

## 2020-03-17 NOTE — TELEPHONE ENCOUNTER
PATIENT HAS PASSPORT AND HAS BEEN ADVISED MULTIPLE TIMES THAT WE CANNOT KEEP REFILLING HER MEDICINE. I CALLED PATIENT TODAY AND ADVISED THAT WE ARE NOT GOING TO REFILL HER PRESCRIPTION AND THAT SHE NEEDS TO CONTACT THE PRIMARY CARE DOCTOR ON HER PASSPORT CARD. PT ARGUED WITH ME ASKING WHAT SHE IS SUPPOSED TO DO AND WAS EXTREMELY ARGUMENTATIVE. I GAVE THE CALL TO LUISA, AND LUISA ADVISED PATIENT THE SAME THING AND PATIENT WAS ARGUMENTATIVE WITH LUISA AND HUNG UP ON HER.

## 2020-05-21 ENCOUNTER — TELEMEDICINE (OUTPATIENT)
Dept: GASTROENTEROLOGY | Facility: CLINIC | Age: 35
End: 2020-05-21

## 2020-05-21 ENCOUNTER — HOSPITAL ENCOUNTER (OUTPATIENT)
Facility: HOSPITAL | Age: 35
Setting detail: HOSPITAL OUTPATIENT SURGERY
End: 2020-05-21
Attending: INTERNAL MEDICINE | Admitting: INTERNAL MEDICINE

## 2020-05-21 DIAGNOSIS — R10.13 DYSPEPSIA: Primary | ICD-10-CM

## 2020-05-21 DIAGNOSIS — K59.04 CHRONIC IDIOPATHIC CONSTIPATION: ICD-10-CM

## 2020-05-21 PROCEDURE — 99203 OFFICE O/P NEW LOW 30 MIN: CPT | Performed by: INTERNAL MEDICINE

## 2020-05-21 RX ORDER — OMEPRAZOLE 40 MG/1
40 CAPSULE, DELAYED RELEASE ORAL DAILY
Qty: 90 CAPSULE | Refills: 3 | Status: SHIPPED | OUTPATIENT
Start: 2020-05-21

## 2020-05-21 NOTE — PROGRESS NOTES
"Video visit:  You have chosen to receive care through a telehealth visit.  Do you consent to use a video/audio connection for your medical care today? Yes    PATIENT INFORMATION  Bessy Mi       - 1985    CHIEF COMPLAINT  Chief Complaint   Patient presents with   • Abdominal Pain     ER FOLLOW UP   • Constipation   • Nausea       HISTORY OF PRESENT ILLNESS  RLQ pain is intermittant and less intense, but now with daily issues of post prandial epigastric discomfort and drinks pickle juice \" a shot\" can resolve.     Has used nexium in the past and has helped but not resolved, and has used TUms prior to pickle juice trick.    Her NSAID usage is 3-4 days out of the week , also worse after alcohol and immediately during meals.    Can skip up to a week and but recently has been daily    States she could have her GB taken out anytime but that wa 7 years ago and hasnt addressed it since-sop questionalble stones??          REVIEW OF SYSTEMS  Review of Systems      ACTIVE PROBLEMS  Patient Active Problem List    Diagnosis   • Hyperlipidemia [E78.5]   • Acne vulgaris [L70.0]   • Low back pain [M54.5]   • Anxiety [F41.9]   • Attention deficit disorder [F98.8]   • Endometriosis [N80.9]   • Uterine leiomyoma [D25.9]         PAST MEDICAL HISTORY  Past Medical History:   Diagnosis Date   • ADHD (attention deficit hyperactivity disorder)    • Anxiety    • Arthritis    • Hypothyroidism    • Low back pain    • Migraine headache          SURGICAL HISTORY  Past Surgical History:   Procedure Laterality Date   • WISDOM TOOTH EXTRACTION           FAMILY HISTORY  Family History   Problem Relation Age of Onset   • Fibromyalgia Mother    • Hypertension Mother    • Lupus Mother    • Colon polyps Mother    • Hypertension Father    • Colon polyps Father    • Colon cancer Neg Hx          SOCIAL HISTORY  Social History     Occupational History   • Not on file   Tobacco Use   • Smoking status: Current Every Day Smoker   • Smokeless " tobacco: Never Used   Substance and Sexual Activity   • Alcohol use: Yes     Comment: Very Rare   • Drug use: No   • Sexual activity: Not on file         CURRENT MEDICATIONS    Current Outpatient Medications:   •  omeprazole (priLOSEC) 40 MG capsule, Take 1 capsule by mouth Daily., Disp: 90 capsule, Rfl: 3    ALLERGIES  Patient has no known allergies.    VITALS  There were no vitals filed for this visit.    LAST RESULTS   Admission on 01/23/2020, Discharged on 01/23/2020   Component Date Value Ref Range Status   • Color, UA 01/23/2020 Yellow  Yellow, Straw Final   • Appearance, UA 01/23/2020 Clear  Clear Final   • pH, UA 01/23/2020 7.0  4.5 - 8.0 Final   • Specific Gravity, UA 01/23/2020 1.020  1.003 - 1.030 Final   • Glucose, UA 01/23/2020 Negative  Negative Final   • Ketones, UA 01/23/2020 Negative  Negative Final   • Bilirubin, UA 01/23/2020 Negative  Negative Final   • Blood, UA 01/23/2020 Negative  Negative Final   • Protein, UA 01/23/2020 Negative  Negative Final   • Leuk Esterase, UA 01/23/2020 Negative  Negative Final   • Nitrite, UA 01/23/2020 Negative  Negative Final   • Urobilinogen, UA 01/23/2020 0.2 E.U./dL  0.2 - 1.0 E.U./dL Final   • Glucose 01/23/2020 94  65 - 99 mg/dL Final   • BUN 01/23/2020 13  6 - 20 mg/dL Final   • Creatinine 01/23/2020 0.87  0.57 - 1.00 mg/dL Final   • Sodium 01/23/2020 136  136 - 145 mmol/L Final   • Potassium 01/23/2020 4.3  3.5 - 5.2 mmol/L Final   • Chloride 01/23/2020 101  98 - 107 mmol/L Final   • CO2 01/23/2020 24.9  22.0 - 29.0 mmol/L Final   • Calcium 01/23/2020 8.4* 8.6 - 10.5 mg/dL Final   • Total Protein 01/23/2020 6.6  6.0 - 8.5 g/dL Final   • Albumin 01/23/2020 3.70  3.50 - 5.20 g/dL Final   • ALT (SGPT) 01/23/2020 19  1 - 33 U/L Final   • AST (SGOT) 01/23/2020 18  1 - 32 U/L Final   • Alkaline Phosphatase 01/23/2020 44  39 - 117 U/L Final   • Total Bilirubin 01/23/2020 0.7  0.2 - 1.2 mg/dL Final   • eGFR Non African Amer 01/23/2020 75  >60 mL/min/1.73 Final   •  Globulin 01/23/2020 2.9  gm/dL Final   • A/G Ratio 01/23/2020 1.3  g/dL Final   • BUN/Creatinine Ratio 01/23/2020 14.9  7.0 - 25.0 Final   • Anion Gap 01/23/2020 10.1  5.0 - 15.0 mmol/L Final   • Lipase 01/23/2020 17  13 - 60 U/L Final   • HCG, Urine QL 01/23/2020 Negative  Negative Final   • WBC 01/23/2020 6.33  3.40 - 10.80 10*3/mm3 Final   • RBC 01/23/2020 4.08  3.77 - 5.28 10*6/mm3 Final   • Hemoglobin 01/23/2020 13.2  12.0 - 15.9 g/dL Final   • Hematocrit 01/23/2020 41.2  34.0 - 46.6 % Final   • MCV 01/23/2020 101.0* 79.0 - 97.0 fL Final   • MCH 01/23/2020 32.4  26.6 - 33.0 pg Final   • MCHC 01/23/2020 32.0  31.5 - 35.7 g/dL Final   • RDW 01/23/2020 12.6  12.3 - 15.4 % Final   • RDW-SD 01/23/2020 47.9  37.0 - 54.0 fl Final   • MPV 01/23/2020 9.9  6.0 - 12.0 fL Final   • Platelets 01/23/2020 206  140 - 450 10*3/mm3 Final   • Neutrophil % 01/23/2020 57.7  42.7 - 76.0 % Final   • Lymphocyte % 01/23/2020 36.2  19.6 - 45.3 % Final   • Monocyte % 01/23/2020 5.2  5.0 - 12.0 % Final   • Eosinophil % 01/23/2020 0.5  0.3 - 6.2 % Final   • Basophil % 01/23/2020 0.2  0.0 - 1.5 % Final   • Immature Grans % 01/23/2020 0.2  0.0 - 0.5 % Final   • Neutrophils, Absolute 01/23/2020 3.66  1.70 - 7.00 10*3/mm3 Final   • Lymphocytes, Absolute 01/23/2020 2.29  0.70 - 3.10 10*3/mm3 Final   • Monocytes, Absolute 01/23/2020 0.33  0.10 - 0.90 10*3/mm3 Final   • Eosinophils, Absolute 01/23/2020 0.03  0.00 - 0.40 10*3/mm3 Final   • Basophils, Absolute 01/23/2020 0.01  0.00 - 0.20 10*3/mm3 Final   • Immature Grans, Absolute 01/23/2020 0.01  0.00 - 0.05 10*3/mm3 Final     No results found.    PHYSICAL EXAM  Debilities/Disabilities Identified: None  Emotional Behavior: Appropriate  Physical Exam    ASSESSMENT  Diagnoses and all orders for this visit:    Dyspepsia  -     Case Request; Standing  -     Obtain Informed Consent; Standing  -     Case Request    Chronic idiopathic constipation    Other orders  -     omeprazole (priLOSEC) 40 MG  capsule; Take 1 capsule by mouth Daily.          PLAN  Daily PPI and Schedule EGD  Return in about 2 months (around 7/21/2020).    I have discussed the above plan with the patient.  They verbalize understanding and are in agreement with the plan.  They have been advised to contact the office for any questions, concerns, or changes related to their health.

## 2020-06-11 ENCOUNTER — TRANSCRIBE ORDERS (OUTPATIENT)
Dept: ADMINISTRATIVE | Facility: HOSPITAL | Age: 35
End: 2020-06-11

## 2020-06-11 DIAGNOSIS — Z01.818 OTHER SPECIFIED PRE-OPERATIVE EXAMINATION: Primary | ICD-10-CM

## 2020-06-24 ENCOUNTER — TELEPHONE (OUTPATIENT)
Dept: GASTROENTEROLOGY | Facility: CLINIC | Age: 35
End: 2020-06-24

## 2020-06-24 NOTE — TELEPHONE ENCOUNTER
Patient called stating Omeprazole 40 mg is worked wonders.  No longer has stomach pains.  She needs to cancel her EGD, unable to self quarantine.  She will call back to reschedule at later day.

## 2020-07-06 ENCOUNTER — APPOINTMENT (OUTPATIENT)
Dept: LAB | Facility: HOSPITAL | Age: 35
End: 2020-07-06

## 2020-09-29 ENCOUNTER — TELEPHONE (OUTPATIENT)
Dept: GASTROENTEROLOGY | Facility: CLINIC | Age: 35
End: 2020-09-29

## 2022-06-22 NOTE — TELEPHONE ENCOUNTER
CALLED PT AND ADVISED SCRIPT READY FOR     ----- Message from Shereen Iverson MA sent at 7/18/2018  2:15 PM EDT -----  Contact: PT      ----- Message -----  From: Brooklyn Baugh MA  Sent: 7/18/2018   8:37 AM  To: Shereen Iverson MA    PT IS NEEDING RX REFILL FOR HYDROcodone-acetaminophen (NORCO)  MG per tablet 4 TIMES A DAY     PT CAN BE REACHED -[phone]    
stable

## 2023-01-23 ENCOUNTER — APPOINTMENT (OUTPATIENT)
Dept: CT IMAGING | Facility: HOSPITAL | Age: 38
End: 2023-01-23
Payer: COMMERCIAL

## 2023-01-23 ENCOUNTER — HOSPITAL ENCOUNTER (EMERGENCY)
Facility: HOSPITAL | Age: 38
Discharge: HOME | End: 2023-01-23
Attending: EMERGENCY MEDICINE | Admitting: EMERGENCY MEDICINE
Payer: COMMERCIAL

## 2023-01-23 ENCOUNTER — APPOINTMENT (OUTPATIENT)
Dept: GENERAL RADIOLOGY | Facility: HOSPITAL | Age: 38
End: 2023-01-23
Payer: COMMERCIAL

## 2023-01-23 VITALS
WEIGHT: 230 LBS | OXYGEN SATURATION: 99 % | DIASTOLIC BLOOD PRESSURE: 94 MMHG | HEIGHT: 66 IN | TEMPERATURE: 98 F | HEART RATE: 76 BPM | BODY MASS INDEX: 36.96 KG/M2 | RESPIRATION RATE: 16 BRPM | SYSTOLIC BLOOD PRESSURE: 134 MMHG

## 2023-01-23 DIAGNOSIS — M79.671 FOOT PAIN, RIGHT: Primary | ICD-10-CM

## 2023-01-23 LAB — B-HCG UR QL: NEGATIVE

## 2023-01-23 PROCEDURE — 81025 URINE PREGNANCY TEST: CPT | Performed by: EMERGENCY MEDICINE

## 2023-01-23 PROCEDURE — 99284 EMERGENCY DEPT VISIT MOD MDM: CPT

## 2023-01-23 PROCEDURE — 73700 CT LOWER EXTREMITY W/O DYE: CPT

## 2023-01-23 PROCEDURE — 73610 X-RAY EXAM OF ANKLE: CPT

## 2023-01-23 PROCEDURE — 73630 X-RAY EXAM OF FOOT: CPT

## 2023-01-23 PROCEDURE — 63710000001 ONDANSETRON ODT 4 MG TABLET DISPERSIBLE: Performed by: EMERGENCY MEDICINE

## 2023-01-23 RX ORDER — HYDROCODONE BITARTRATE AND ACETAMINOPHEN 5; 325 MG/1; MG/1
1 TABLET ORAL EVERY 6 HOURS PRN
Qty: 12 TABLET | Refills: 0 | Status: SHIPPED | OUTPATIENT
Start: 2023-01-23

## 2023-01-23 RX ORDER — HYDROCODONE BITARTRATE AND ACETAMINOPHEN 10; 325 MG/1; MG/1
1 TABLET ORAL EVERY 6 HOURS PRN
Status: DISCONTINUED | OUTPATIENT
Start: 2023-01-23 | End: 2023-01-23 | Stop reason: HOSPADM

## 2023-01-23 RX ORDER — ONDANSETRON 4 MG/1
4 TABLET, ORALLY DISINTEGRATING ORAL ONCE
Status: COMPLETED | OUTPATIENT
Start: 2023-01-23 | End: 2023-01-23

## 2023-01-23 RX ADMIN — HYDROCODONE BITARTRATE AND ACETAMINOPHEN 1 TABLET: 10; 325 TABLET ORAL at 09:40

## 2023-01-23 RX ADMIN — ONDANSETRON 4 MG: 4 TABLET, ORALLY DISINTEGRATING ORAL at 09:39

## 2023-01-23 NOTE — ED PROVIDER NOTES
Subjective   History of Present Illness  37-year-old female past medical history significant for arthritis anxiety ADHD hypothyroidism presents emergency room with complaint of pain to her right heel.  Patient stomped her foot very hard on the ground last night at her dog and immediately felt pain in her right heel with inability to put weight on it now or ambulate.  She states that when she tries to stand on it it makes her feel like she is going to vomit because of the pain.  Patient has no other complaints.  Last meal was last night.        Review of Systems   Constitutional: Negative for activity change, appetite change, chills, diaphoresis, fatigue and fever.   HENT: Negative for congestion, rhinorrhea and sore throat.    Eyes: Negative for photophobia and visual disturbance.   Respiratory: Negative for cough and shortness of breath.    Cardiovascular: Negative for chest pain, palpitations and leg swelling.   Gastrointestinal: Negative for abdominal distention, abdominal pain, diarrhea, nausea and vomiting.   Genitourinary: Negative for dysuria and flank pain.   Musculoskeletal: Negative for arthralgias and back pain.        Right foot pain   Skin: Negative for rash.   Neurological: Negative for dizziness, weakness and headaches.   Psychiatric/Behavioral: Negative for agitation and behavioral problems.       Past Medical History:   Diagnosis Date   • ADHD (attention deficit hyperactivity disorder)    • Anxiety    • Arthritis    • Hypothyroidism    • Low back pain    • Migraine headache        No Known Allergies    Past Surgical History:   Procedure Laterality Date   • WISDOM TOOTH EXTRACTION         Family History   Problem Relation Age of Onset   • Fibromyalgia Mother    • Hypertension Mother    • Lupus Mother    • Colon polyps Mother    • Hypertension Father    • Colon polyps Father    • Colon cancer Neg Hx        Social History     Socioeconomic History   • Marital status:    Tobacco Use   • Smoking  status: Every Day   • Smokeless tobacco: Never   Vaping Use   • Vaping Use: Never used   Substance and Sexual Activity   • Alcohol use: Yes     Alcohol/week: 6.0 standard drinks     Types: 6 Cans of beer per week     Comment: Very Rare   • Drug use: No           Objective   Physical Exam  Constitutional:       General: She is not in acute distress.     Appearance: Normal appearance. She is not ill-appearing, toxic-appearing or diaphoretic.   HENT:      Head: Normocephalic and atraumatic.      Nose: Nose normal.      Mouth/Throat:      Mouth: Mucous membranes are moist.   Eyes:      Conjunctiva/sclera: Conjunctivae normal.   Cardiovascular:      Rate and Rhythm: Normal rate.      Pulses: Normal pulses.   Pulmonary:      Effort: Pulmonary effort is normal.   Abdominal:      General: Abdomen is flat. There is no distension.      Tenderness: There is no abdominal tenderness.   Musculoskeletal:         General: No swelling.      Cervical back: Normal range of motion.      Right foot: Decreased range of motion. Swelling, tenderness and bony tenderness present. No deformity, bunion, Charcot foot, foot drop or crepitus.      Left foot: Normal.      Comments: Patient has tenderness to palpation of her entire heel of the sole of her foot.  There is tenderness includes all areas of the calcaneus.  She does have range of motion of her digits and can minimally flex and extend her foot secondary to pain.  Neurovascular intact distally.  There is no ecchymosis erythema abrasion or laceration present however there is some mild edema.   Skin:     General: Skin is warm and dry.   Neurological:      General: No focal deficit present.      Mental Status: She is alert and oriented to person, place, and time.   Psychiatric:         Mood and Affect: Mood normal.         Procedures           ED Course  ED Course as of 01/23/23 1338   Mon Jan 23, 2023   1202 X-rays of foot and ankle read as negative however patient still having exquisite  tenderness and unable to bear weight.  We will proceed with CT of lower extremity for further delineation possible fracture [BH]   1335 CT of foot and ankle show no fracture dislocation or other acute abnormality.    Patient is to be placed in Ortho boot and with crutches and with pain control follow-up with orthopedics as needed.  Patient states she understands agrees the plan. [BH]   1336 CT Lower Extremity Right Without Contrast [BH]   1336 XR Ankle 3+ View Right [BH]   1336 XR Foot 3+ View Right [BH]      ED Course User Index  [BH] Jose L Zayas MD                                           Medical Decision Making  My diagnosis for lower extremity pain and injury includes but is not limited to hip fracture, femur fracture, hip dislocation, hip contusion, hip sprain, hip strain, pelvic fracture, ischio-tibial band pain, ischio-tibial band bursitis, knee sprain, patella dislocation, knee dislocation, internal derangement of knee, fractures of the femur, tibia, fibula, ankle, foot and digits, ankle sprain, ankle dislocation, Lisfranc fracture, fracture dislocations of the digits, pulmonary embolism, claudication, peripheral vascular disease, gout, osteoarthritis, rheumatoid arthritis, bursitis, septic joint, poly-rheumatica, polyarthralgia and other inflammatory or infectious disease processes.    Amount and/or Complexity of Data Reviewed  Radiology: ordered. Decision-making details documented in ED Course.      Risk  Prescription drug management.          Final diagnoses:   Foot pain, right       ED Disposition  ED Disposition     ED Disposition   Discharge    Condition   Stable    Comment   --             Richard Delgado MD  [address] [id number] [phone]    Schedule an appointment as soon as possible for a visit   As needed    [facility] Emergency Department  [facility] [facility] [address] [phone]  Go to   As needed          Medication List      New Prescriptions    HYDROcodone-acetaminophen 5-325 MG per tablet  Commonly known as: NORCO  Take 1 tablet by mouth Every 6 (Six) Hours As Needed for Moderate Pain.           Where to Get Your Medications      These medications were sent to [facility] [place] - [address] - [phone]  - [phone]   [address], [facility] [address]    Phone: [phone]   · HYDROcodone-acetaminophen 5-325 MG per tablet          Jose L Zayas MD  01/23/23 1384

## 2023-01-23 NOTE — Clinical Note
[place]  [facility] EMERGENCY DEPARTMENT  [address]  [place] [address]  Phone: [phone]    Bessy Mi was seen and treated in our emergency department on 1/23/2023.  She may return to work on 01/26/2023.         Thank you for choosing [facility].    Jose L Zayas MD

## 2023-02-07 ENCOUNTER — OFFICE VISIT (OUTPATIENT)
Dept: ORTHOPEDIC SURGERY | Facility: CLINIC | Age: 38
End: 2023-02-07
Payer: COMMERCIAL

## 2023-02-07 VITALS — HEIGHT: 66 IN | BODY MASS INDEX: 38.57 KG/M2 | WEIGHT: 240 LBS

## 2023-02-07 DIAGNOSIS — M72.2 PLANTAR FASCIITIS, RIGHT: Primary | ICD-10-CM

## 2023-02-07 PROCEDURE — 99203 OFFICE O/P NEW LOW 30 MIN: CPT | Performed by: ORTHOPAEDIC SURGERY

## 2023-02-07 RX ORDER — IBUPROFEN 800 MG/1
800 TABLET ORAL EVERY 6 HOURS PRN
COMMUNITY
End: 2023-02-07

## 2023-02-07 RX ORDER — PREDNISONE 10 MG/1
10 TABLET ORAL DAILY
Qty: 18 TABLET | Refills: 0 | Status: SHIPPED | OUTPATIENT
Start: 2023-02-07

## 2023-02-07 RX ORDER — MELOXICAM 15 MG/1
15 TABLET ORAL DAILY
Qty: 30 TABLET | Refills: 5 | Status: SHIPPED | OUTPATIENT
Start: 2023-02-07

## 2023-02-07 NOTE — PROGRESS NOTES
Subjective: Right heel pain     Patient ID: Bessy Mi is a 37 y.o. female.    Chief Complaint:    History of Present Illness 37-year-old female seen for right heel pain that she has had for about 2 weeks.  Noted acute onset of the pain after stomping her foot on the ground given a command to her dog.  Still in intense pain was almost unable to bear weight.  Was seen in the ER x-ray given hydrocodone for pain placed in a fracture boot and referred here.  Denies any prior history of foot or ankle pain.  Really cannot ambulate without the fracture boot.  Describes the pain as 10 out of 10       Social History     Occupational History   • Not on file   Tobacco Use   • Smoking status: Every Day     Packs/day: 0.50     Years: 15.00     Pack years: 7.50     Types: Cigarettes   • Smokeless tobacco: Never   Vaping Use   • Vaping Use: Never used   Substance and Sexual Activity   • Alcohol use: Yes     Alcohol/week: 6.0 standard drinks     Types: 6 Cans of beer per week     Comment: Very Rare   • Drug use: No   • Sexual activity: Defer      Review of Systems      Past Medical History:   Diagnosis Date   • ADHD (attention deficit hyperactivity disorder)    • Anxiety    • Arthritis    • Hypothyroidism    • Low back pain    • Migraine headache      Past Surgical History:   Procedure Laterality Date   • WISDOM TOOTH EXTRACTION       Family History   Problem Relation Age of Onset   • Fibromyalgia Mother    • Hypertension Mother    • Lupus Mother    • Colon polyps Mother    • Hypertension Father    • Colon polyps Father    • Colon cancer Neg Hx          Objective:  There were no vitals filed for this visit.      02/07/23  1446   Weight: 109 kg (240 lb)     Body mass index is 38.74 kg/m².        Ortho Exam   AP lateral and oblique of the ankle does show a large calcaneal spur but no acute changes noted in AP lateral oblique of the foot again shows a calcaneal spur otherwise unremarkable.  She is alert and oriented x3.  Head is  normocephalic and sclerae clear.  Right lower extremity has no motor or sensory deficit she has a good dorsalis pedis.  There is no tenderness to the Achilles tendon or the calf but there is moderate to severe pain to compression of the calcaneus and plantar pressure over the calcaneal spur.  Some swelling also to the plantar fascia.  Moderate to severe pain with plantar dorsiflexion against resistance.  Skin is cool to touch.  There is no erythema.  She is tolerating the ibuprofen 803-4 times a day beginning no substantial relief of her pain.    Assessment:    XR Ankle 3+ View Right    Result Date: 1/23/2023  Negative right ankle.  This report was finalized on 1/23/2023 11:26 AM by Dr. Cipriano Gutierrez MD.      XR Foot 3+ View Right    Result Date: 1/23/2023  Negative right foot.  This report was finalized on 1/23/2023 11:25 AM by Dr. Cipriano Gutierrez MD.      CT Lower Extremity Right Without Contrast    Result Date: 1/23/2023  No CT evidence of a displaced fracture, stress reaction, or dislocation. If clinical symptoms persist and there is concern for a radiographically occult fracture or stress reaction, follow-up with noncontrast MRI of the right ankle and/or midfoot could be considered. Signer Name: Julito Duarte MD  Signed: 1/23/2023 1:07 PM  Workstation Name: [id number]  [facility]              1. Plantar fasciitis, right           Plan: Reviewed the x-rays of the ankle and the foot and her history and exam with the patient.  She is plantar fasciitis of the foot.  I reviewed treatment options with him again prednisone over 9-day course followed by meloxicam 15 mg daily.  Advised on stretching exercises after bathing.  Weightbearing as tolerated in the fracture boot return in 3 weeks if still symptomatic.  Answered all questions            Work Status:    DAVID query complete.    Orders:  No orders of the defined types were placed in this encounter.      Medications:  New Medications Ordered  This Visit   Medications   • predniSONE (DELTASONE) 10 MG tablet     Sig: Take 1 tablet by mouth Daily. Take 10 mg 3 times a day for 3 days then 10 mg twice a day for 3 days then 10 mg a day for 3 days.  Following completion of the prednisone begin meloxicam 15 mg daily     Dispense:  18 tablet     Refill:  0   • meloxicam (MOBIC) 15 MG tablet     Sig: Take 1 tablet by mouth Daily.     Dispense:  30 tablet     Refill:  5       Followup:  Return in about 3 weeks (around 2/28/2023).          Dictated utilizing Dragon dictation

## 2023-03-30 ENCOUNTER — APPOINTMENT (OUTPATIENT)
Dept: CT IMAGING | Facility: HOSPITAL | Age: 38
End: 2023-03-30
Payer: COMMERCIAL

## 2023-03-30 ENCOUNTER — HOSPITAL ENCOUNTER (EMERGENCY)
Facility: HOSPITAL | Age: 38
Discharge: HOME | End: 2023-03-30
Attending: EMERGENCY MEDICINE | Admitting: EMERGENCY MEDICINE
Payer: COMMERCIAL

## 2023-03-30 VITALS
OXYGEN SATURATION: 99 % | SYSTOLIC BLOOD PRESSURE: 134 MMHG | TEMPERATURE: 97.7 F | HEIGHT: 66 IN | RESPIRATION RATE: 16 BRPM | HEART RATE: 78 BPM | DIASTOLIC BLOOD PRESSURE: 92 MMHG | WEIGHT: 254.4 LBS | BODY MASS INDEX: 40.88 KG/M2

## 2023-03-30 DIAGNOSIS — N30.01 ACUTE CYSTITIS WITH HEMATURIA: ICD-10-CM

## 2023-03-30 DIAGNOSIS — R10.11 RIGHT UPPER QUADRANT ABDOMINAL PAIN: Primary | ICD-10-CM

## 2023-03-30 DIAGNOSIS — R91.1 PULMONARY NODULE: ICD-10-CM

## 2023-03-30 LAB
ALBUMIN SERPL-MCNC: 3.5 G/DL (ref 3.5–5.2)
ALBUMIN/GLOB SERPL: 1.4 G/DL
ALP SERPL-CCNC: 68 U/L (ref 39–117)
ALT SERPL W P-5'-P-CCNC: 24 U/L (ref 1–33)
ANION GAP SERPL CALCULATED.3IONS-SCNC: 9.2 MMOL/L (ref 5–15)
AST SERPL-CCNC: 23 U/L (ref 1–32)
B-HCG UR QL: NEGATIVE
BACTERIA UR QL AUTO: ABNORMAL /HPF
BASOPHILS # BLD AUTO: 0.03 10*3/MM3 (ref 0–0.2)
BASOPHILS NFR BLD AUTO: 0.5 % (ref 0–1.5)
BILIRUB SERPL-MCNC: 0.6 MG/DL (ref 0–1.2)
BILIRUB UR QL STRIP: NEGATIVE
BUN SERPL-MCNC: 12 MG/DL (ref 6–20)
BUN/CREAT SERPL: 12.4 (ref 7–25)
CALCIUM SPEC-SCNC: 8.7 MG/DL (ref 8.6–10.5)
CHLORIDE SERPL-SCNC: 104 MMOL/L (ref 98–107)
CLARITY UR: ABNORMAL
CO2 SERPL-SCNC: 21.8 MMOL/L (ref 22–29)
COLOR UR: ABNORMAL
CREAT SERPL-MCNC: 0.97 MG/DL (ref 0.57–1)
DEPRECATED RDW RBC AUTO: 51.8 FL (ref 37–54)
EGFRCR SERPLBLD CKD-EPI 2021: 77.8 ML/MIN/1.73
EOSINOPHIL # BLD AUTO: 0.01 10*3/MM3 (ref 0–0.4)
EOSINOPHIL NFR BLD AUTO: 0.2 % (ref 0.3–6.2)
ERYTHROCYTE [DISTWIDTH] IN BLOOD BY AUTOMATED COUNT: 13.4 % (ref 12.3–15.4)
GLOBULIN UR ELPH-MCNC: 2.5 GM/DL
GLUCOSE SERPL-MCNC: 90 MG/DL (ref 65–99)
GLUCOSE UR STRIP-MCNC: NEGATIVE MG/DL
HCT VFR BLD AUTO: 40.8 % (ref 34–46.6)
HGB BLD-MCNC: 14 G/DL (ref 12–15.9)
HGB UR QL STRIP.AUTO: ABNORMAL
HOLD SPECIMEN: NORMAL
HOLD SPECIMEN: NORMAL
HYALINE CASTS UR QL AUTO: ABNORMAL /LPF
IMM GRANULOCYTES # BLD AUTO: 0.01 10*3/MM3 (ref 0–0.05)
IMM GRANULOCYTES NFR BLD AUTO: 0.2 % (ref 0–0.5)
KETONES UR QL STRIP: NEGATIVE
LEUKOCYTE ESTERASE UR QL STRIP.AUTO: NEGATIVE
LIPASE SERPL-CCNC: 29 U/L (ref 13–60)
LYMPHOCYTES # BLD AUTO: 1.88 10*3/MM3 (ref 0.7–3.1)
LYMPHOCYTES NFR BLD AUTO: 31.2 % (ref 19.6–45.3)
MCH RBC QN AUTO: 35.6 PG (ref 26.6–33)
MCHC RBC AUTO-ENTMCNC: 34.3 G/DL (ref 31.5–35.7)
MCV RBC AUTO: 103.8 FL (ref 79–97)
MONOCYTES # BLD AUTO: 0.39 10*3/MM3 (ref 0.1–0.9)
MONOCYTES NFR BLD AUTO: 6.5 % (ref 5–12)
NEUTROPHILS NFR BLD AUTO: 3.71 10*3/MM3 (ref 1.7–7)
NEUTROPHILS NFR BLD AUTO: 61.4 % (ref 42.7–76)
NITRITE UR QL STRIP: POSITIVE
NRBC BLD AUTO-RTO: 0 /100 WBC (ref 0–0.2)
PH UR STRIP.AUTO: 6.5 [PH] (ref 4.5–8)
PLATELET # BLD AUTO: 207 10*3/MM3 (ref 140–450)
PMV BLD AUTO: 9.8 FL (ref 6–12)
POTASSIUM SERPL-SCNC: 4 MMOL/L (ref 3.5–5.2)
PROT SERPL-MCNC: 6 G/DL (ref 6–8.5)
PROT UR QL STRIP: ABNORMAL
RBC # BLD AUTO: 3.93 10*6/MM3 (ref 3.77–5.28)
RBC # UR STRIP: ABNORMAL /HPF
REF LAB TEST METHOD: ABNORMAL
SODIUM SERPL-SCNC: 135 MMOL/L (ref 136–145)
SP GR UR STRIP: 1.02 (ref 1–1.03)
SQUAMOUS #/AREA URNS HPF: ABNORMAL /HPF
TROPONIN T SERPL HS-MCNC: 6 NG/L
UROBILINOGEN UR QL STRIP: ABNORMAL
WBC # UR STRIP: ABNORMAL /HPF
WBC NRBC COR # BLD: 6.03 10*3/MM3 (ref 3.4–10.8)
WHOLE BLOOD HOLD COAG: NORMAL
WHOLE BLOOD HOLD SPECIMEN: NORMAL

## 2023-03-30 PROCEDURE — 96361 HYDRATE IV INFUSION ADD-ON: CPT

## 2023-03-30 PROCEDURE — 99284 EMERGENCY DEPT VISIT MOD MDM: CPT

## 2023-03-30 PROCEDURE — 25010000002 ONDANSETRON PER 1 MG: Performed by: EMERGENCY MEDICINE

## 2023-03-30 PROCEDURE — 84484 ASSAY OF TROPONIN QUANT: CPT | Performed by: EMERGENCY MEDICINE

## 2023-03-30 PROCEDURE — 74177 CT ABD & PELVIS W/CONTRAST: CPT

## 2023-03-30 PROCEDURE — 83690 ASSAY OF LIPASE: CPT | Performed by: EMERGENCY MEDICINE

## 2023-03-30 PROCEDURE — 96374 THER/PROPH/DIAG INJ IV PUSH: CPT

## 2023-03-30 PROCEDURE — 96376 TX/PRO/DX INJ SAME DRUG ADON: CPT

## 2023-03-30 PROCEDURE — 25010000002 MORPHINE PER 10 MG: Performed by: EMERGENCY MEDICINE

## 2023-03-30 PROCEDURE — 71260 CT THORAX DX C+: CPT

## 2023-03-30 PROCEDURE — 81025 URINE PREGNANCY TEST: CPT | Performed by: EMERGENCY MEDICINE

## 2023-03-30 PROCEDURE — 25510000001 IOPAMIDOL 61 % SOLUTION: Performed by: EMERGENCY MEDICINE

## 2023-03-30 PROCEDURE — 81001 URINALYSIS AUTO W/SCOPE: CPT | Performed by: EMERGENCY MEDICINE

## 2023-03-30 PROCEDURE — 80053 COMPREHEN METABOLIC PANEL: CPT | Performed by: EMERGENCY MEDICINE

## 2023-03-30 PROCEDURE — 96375 TX/PRO/DX INJ NEW DRUG ADDON: CPT

## 2023-03-30 PROCEDURE — 85025 COMPLETE CBC W/AUTO DIFF WBC: CPT | Performed by: EMERGENCY MEDICINE

## 2023-03-30 RX ORDER — CEPHALEXIN 500 MG/1
500 CAPSULE ORAL 4 TIMES DAILY
Qty: 28 CAPSULE | Refills: 0 | Status: SHIPPED | OUTPATIENT
Start: 2023-03-30

## 2023-03-30 RX ORDER — DICYCLOMINE HCL 20 MG
20 TABLET ORAL EVERY 8 HOURS PRN
Qty: 20 TABLET | Refills: 0 | Status: SHIPPED | OUTPATIENT
Start: 2023-03-30

## 2023-03-30 RX ORDER — ONDANSETRON 2 MG/ML
4 INJECTION INTRAMUSCULAR; INTRAVENOUS ONCE
Status: COMPLETED | OUTPATIENT
Start: 2023-03-30 | End: 2023-03-30

## 2023-03-30 RX ORDER — SODIUM CHLORIDE 0.9 % (FLUSH) 0.9 %
10 SYRINGE (ML) INJECTION AS NEEDED
Status: DISCONTINUED | OUTPATIENT
Start: 2023-03-30 | End: 2023-03-30 | Stop reason: HOSPADM

## 2023-03-30 RX ORDER — ONDANSETRON 4 MG/1
4 TABLET, ORALLY DISINTEGRATING ORAL EVERY 8 HOURS PRN
Qty: 20 TABLET | Refills: 0 | Status: SHIPPED | OUTPATIENT
Start: 2023-03-30

## 2023-03-30 RX ADMIN — MORPHINE SULFATE 4 MG: 4 INJECTION, SOLUTION INTRAMUSCULAR; INTRAVENOUS at 10:11

## 2023-03-30 RX ADMIN — ONDANSETRON 4 MG: 2 INJECTION INTRAMUSCULAR; INTRAVENOUS at 13:41

## 2023-03-30 RX ADMIN — SODIUM CHLORIDE, POTASSIUM CHLORIDE, SODIUM LACTATE AND CALCIUM CHLORIDE 1000 ML: 600; 310; 30; 20 INJECTION, SOLUTION INTRAVENOUS at 10:11

## 2023-03-30 RX ADMIN — ONDANSETRON 4 MG: 2 INJECTION INTRAMUSCULAR; INTRAVENOUS at 10:11

## 2023-03-30 RX ADMIN — IOPAMIDOL 100 ML: 612 INJECTION, SOLUTION INTRAVENOUS at 10:55

## 2023-03-30 RX ADMIN — IOPAMIDOL 100 ML: 612 INJECTION, SOLUTION INTRAVENOUS at 13:10

## 2023-03-30 NOTE — ED PROVIDER NOTES
Subjective   History of Present Illness  Patient presents complaining of a 1 month history of abdominal pain.  Patient says it comes and goes and usually last about 20 minutes.  Patient can sometimes take some over-the-counter antiacid and it feels better.  Generally occurs after she is eating.  Patient describes it in her epigastrium and right upper quadrant and going straight into her mid back.  Patient reports for the last 2 weeks she has been having some increased nausea, loose stools, and vomiting.  No blood in the stool or emesis.  Emesis is described as being slimy and yellow.  Patient said at 6 AM this morning she woke up and the pain has been constant since then.  Patient has severe nausea.  Nothing seems to make it better or worse.  Patient's denies any recent travel, sick contacts, bad food exposure, or trauma.  No therapy taken prior to arrival.        Review of Systems   All other systems reviewed and are negative.      Past Medical History:   Diagnosis Date   • ADHD (attention deficit hyperactivity disorder)    • Anxiety    • Arthritis    • Hypothyroidism    • Low back pain    • Migraine headache        No Known Allergies    Past Surgical History:   Procedure Laterality Date   • WISDOM TOOTH EXTRACTION         Family History   Problem Relation Age of Onset   • Fibromyalgia Mother    • Hypertension Mother    • Lupus Mother    • Colon polyps Mother    • Hypertension Father    • Colon polyps Father    • Colon cancer Neg Hx        Social History     Socioeconomic History   • Marital status:    Tobacco Use   • Smoking status: Every Day     Packs/day: 0.50     Years: 15.00     Pack years: 7.50     Types: Cigarettes   • Smokeless tobacco: Never   Vaping Use   • Vaping Use: Never used   Substance and Sexual Activity   • Alcohol use: Yes     Alcohol/week: 9.0 standard drinks     Types: 3 Glasses of wine, 6 Cans of beer per week   • Drug use: No   • Sexual activity: Defer           Objective   Physical  Exam  Vitals and nursing note reviewed.   Constitutional:       Appearance: She is well-developed.      Comments: Patient lying in bed in the fetal position watching show on her phone.  No obvious distress.  Patient grimaces when asked to lay on her back with her leg straight. Pt points to upper mid and right abd.   HENT:      Head: Normocephalic.   Cardiovascular:      Rate and Rhythm: Normal rate and regular rhythm.   Pulmonary:      Effort: Pulmonary effort is normal.      Breath sounds: Normal breath sounds. No wheezing, rhonchi or rales.   Abdominal:      General: Abdomen is flat. Bowel sounds are increased. There is no distension.      Palpations: Abdomen is soft.      Tenderness: There is abdominal tenderness in the right upper quadrant and epigastric area.      Hernia: No hernia is present.   Skin:     General: Skin is warm and dry.      Capillary Refill: Capillary refill takes 2 to 3 seconds.   Neurological:      Mental Status: She is alert and oriented to person, place, and time.   Psychiatric:         Mood and Affect: Mood normal.         Behavior: Behavior normal.         ECG 12 Lead      Date/Time: 3/30/2023 10:04 AM  Performed by: JoseJ Taylor MD  Authorized by: Jose J Taylor MD   Interpreted by physician  Comparison: not compared with previous ECG   Previous ECG: no previous ECG available  Comments: Sinus rhythm, rate of 65, normal axis, no acute ST elevation or depression.                 ED Course  ED Course as of 03/30/23 1350   Thu Mar 30, 2023   1215 I spoke with the [facility] hematology and oncology group and the  was going to get the patient appointment either Friday or the beginning of next week.  The on-call oncologist called back and ask if we could go ahead and do the CT chest to complete the work-up before patient was discharged.  I discussed with patient and they are in agreement. [AW]      ED Course User Index  [AW] Jose J Taylor MD                                            Medical Decision Making  ddx gastritis, GERD, gastroenteritis, pancreatitis, cholecystitis, gallbladder issues, pancreatitis, enteritis, gastric emptying syndrome    Labs Reviewed  COMPREHENSIVE METABOLIC PANEL - Abnormal; Notable for the following components:     Sodium                        135 (*)                CO2                           21.8 (*)            All other components within normal limits         Narrative: GFR Normal >60                  Chronic Kidney Disease <60                  Kidney Failure <15                    URINALYSIS W/ MICROSCOPIC IF INDICATED (NO CULTURE) - Abnormal; Notable for the following components:     Color, UA                     Other (*)               Appearance, UA                  (*)                  Blood, UA                     Trace (*)               Protein, UA                   Trace (*)               Nitrite, UA                   Positive (*)            All other components within normal limits  CBC WITH AUTO DIFFERENTIAL - Abnormal; Notable for the following components:     MCV                           103.8 (*)               MCH                           35.6 (*)               Eosinophil %                  0.2 (*)             All other components within normal limits  URINALYSIS, MICROSCOPIC ONLY - Abnormal; Notable for the following components:     RBC, UA                       0-2 (*)                WBC, UA                       0-2 (*)                Bacteria, UA                  1+ (*)                 Squamous Epithelial Cells, UA   13-20 (*)            All other components within normal limits  LIPASE - Normal  SINGLE HSTROPONIN T - Normal         Narrative: High Sensitive Troponin T Reference Range:                  <10.0 ng/L- Negative Female for AMI                  <15.0 ng/L- Negative Male for AMI                  >=10 - Abnormal Female indicating possible myocardial injury.                  >=15 - Abnormal Male indicating possible  myocardial injury.                   Clinicians would have to utilize clinical acumen, EKG, Troponin, and serial changes to determine if it is an Acute Myocardial Infarction or myocardial injury due to an underlying chronic condition.                                       PREGNANCY, URINE - Normal  RAINBOW DRAW         Narrative: The following orders were created for panel order [place] Draw.                  Procedure                               Abnormality         Status                                     ---------                               -----------         ------                                     Green Top (Gel)([id number])                                  Final result                               Lavender Top([id number])                                     Final result                               Gold Top - SST([id number])                                   Final result                               Light Blue Top([id number])                                   Final result                                                 Please view results for these tests on the individual orders.  CBC AND DIFFERENTIAL         Narrative: The following orders were created for panel order CBC & Differential.                  Procedure                               Abnormality         Status                                     ---------                               -----------         ------                                     CBC Auto Differential([id number])        Abnormal            Final result                                                 Please view results for these tests on the individual orders.  GREEN TOP  LAVENDER TOP  GOLD TOP - SST  LIGHT BLUE TOP    CT Chest With Contrast Diagnostic    Result Date: 3/30/2023  Numerous pulmonary nodules bilaterally. At least 20 nodules are present in total measuring up to 1 cm in diameter and are concerning for metastatic disease. Otherwise study is normal Signer Name:  Teddy Ojeda MD  Signed: 3/30/2023 1:28 PM  Workstation Name: [id number]  [facility]    CT Abdomen Pelvis With Contrast    Result Date: 3/30/2023  There are multiple new solid noncalcified pulmonary nodules in the lung bases and in the right middle lobe concerning for metastatic disease.  Recommend complete chest CT  Appendix is normal. No cause for patient's right side pain is identified. There are no urinary stones identified.  This report was finalized on 3/30/2023 11:31 AM by Dr. Teddy Ojeda MD.      1330 Pt seen again prior to d/c.  Labs/Imaging reviewed.  Patient's blood work shows no major abnormalities.  Urinalysis is concerning for possible UTI so patient be covered with antibiotics.  Patient's abdominal scan showed no significant abnormalities.  Gallbladder was unremarkable.  No inflammation or wall thickening around stomach or pancreas.  No signs of constipation or bowel inflammation.  Patient's symptoms improved with medicine.  Additional concerning finding unrelated to patient's complaint were pulmonary nodules in the chest.  This is a new finding.  I did discuss with hematology oncology and they are going to follow-up with patient for further evaluation.  They did request the chest CT that was completed prior to patient exiting. Vitals stable and pt. in NAD. Non-toxic. Comfortable. Ambulating without difficulty.  Tolerating po. No vomiting since arrival. Relaxed breathing.  All questions personally answered at the bedside and all d/c instructions personally reviewed with pt.  Discussed the importance of close outpt. f/u and pt. understands this and agrees to do so.  Pt agrees to return to ED immediately for any new, persistent, or worsening symptoms.    EMR Dragon/Transcription disclaimer:  Much of this encounter note is an electronic transcription/translation of spoken language to printed text, aka voice recognition.  The electronic translation of spoken language may permit  erroneous or at times nonsensical words or phrases to be inadvertently transcribed; although I have reviewed the note for such errors, some may still exist so please interpret based on surrounding text content.          Acute cystitis with hematuria: acute illness or injury  Pulmonary nodule: acute illness or injury  Right upper quadrant abdominal pain: acute illness or injury  Amount and/or Complexity of Data Reviewed  Labs: ordered.  Radiology: ordered.      Risk  Prescription drug management.          Final diagnoses:   Right upper quadrant abdominal pain   Pulmonary nodule   Acute cystitis with hematuria       ED Disposition  ED Disposition     ED Disposition   Discharge    Condition   Stable    Comment   --             [facility]  [address], [address]  [place] [address] [phone]  In 1 week  The office will call you for the appointment.         Medication List      New Prescriptions    cephalexin 500 MG capsule  Commonly known as: KEFLEX  Take 1 capsule by mouth 4 (Four) Times a Day.     dicyclomine 20 MG tablet  Commonly known as: BENTYL  Take 1 tablet by mouth Every 8 (Eight) Hours As Needed (abdominal cramps/pain).     ondansetron ODT 4 MG disintegrating tablet  Commonly known as: ZOFRAN-ODT  Place 1 tablet on the tongue Every 8 (Eight) Hours As Needed for Nausea or Vomiting.           Where to Get Your Medications      These medications were sent to [place], [place] - [address] [phone]  - [phone]   [address] [address] [facility] [address]    Phone: [phone]   · cephalexin 500 MG capsule  · dicyclomine 20 MG tablet  · ondansetron ODT 4 MG disintegrating tablet          Jose J Taylor MD  03/30/23 1913

## 2023-03-30 NOTE — Clinical Note
[place]  [facility] EMERGENCY DEPARTMENT  [address]  [place] [address]  Phone: [phone]    Bessy Mi was seen and treated in our emergency department on 3/30/2023.  She may return to work on 03/31/2023.         Thank you for choosing [facility].    Jose J Taylor MD

## 2023-03-30 NOTE — Clinical Note
[place]  [facility] EMERGENCY DEPARTMENT  [address]  [place] [address]  Phone: [phone]    Bessy Mi was seen and treated in our emergency department on 3/30/2023.  She may return to work on 03/31/2023.         Thank you for choosing [facility].    Jose J Taylor MD

## 2023-04-04 ENCOUNTER — OFFICE VISIT (OUTPATIENT)
Dept: OTHER | Facility: HOSPITAL | Age: 38
End: 2023-04-04
Payer: COMMERCIAL

## 2023-04-04 VITALS
BODY MASS INDEX: 40.82 KG/M2 | HEART RATE: 83 BPM | TEMPERATURE: 98 F | WEIGHT: 254 LBS | OXYGEN SATURATION: 97 % | SYSTOLIC BLOOD PRESSURE: 138 MMHG | HEIGHT: 66 IN | DIASTOLIC BLOOD PRESSURE: 92 MMHG

## 2023-04-04 VITALS
OXYGEN SATURATION: 97 % | HEIGHT: 66 IN | DIASTOLIC BLOOD PRESSURE: 92 MMHG | BODY MASS INDEX: 40.82 KG/M2 | TEMPERATURE: 98 F | WEIGHT: 254 LBS | HEART RATE: 83 BPM | SYSTOLIC BLOOD PRESSURE: 138 MMHG

## 2023-04-04 DIAGNOSIS — R91.8 MULTIPLE PULMONARY NODULES DETERMINED BY COMPUTED TOMOGRAPHY OF LUNG: Primary | ICD-10-CM

## 2023-04-04 DIAGNOSIS — R91.8 MULTIPLE PULMONARY NODULES: Primary | ICD-10-CM

## 2023-04-04 PROCEDURE — 99204 OFFICE O/P NEW MOD 45 MIN: CPT | Performed by: STUDENT IN AN ORGANIZED HEALTH CARE EDUCATION/TRAINING PROGRAM

## 2023-04-04 PROCEDURE — G0463 HOSPITAL OUTPT CLINIC VISIT: HCPCS | Performed by: STUDENT IN AN ORGANIZED HEALTH CARE EDUCATION/TRAINING PROGRAM

## 2023-04-04 NOTE — PATIENT INSTRUCTIONS
Met with patient and family member in lung clinic on 4/4/23. Patient met with Dr. Johnson and Dr. Rios. PET scheduled 4/11/23. Provided education pamphlet for PET. Explained pertinent information regarding preparation for and location of test. Patient able to repeat back and verbalize understanding. No additional needs noted.  Pt will be seen for follow up appointment in lung clinic on 4/18/23.   Salena Johnson RN, BSN, OCN, CCM, Lung navigator

## 2023-04-04 NOTE — PROGRESS NOTES
.     REASON FOR CONSULTATION:     Provide an opinion on any further workup or treatment of multiple lung nodules on CT scan examination.                             REQUESTING PHYSICIAN: Jose J Taylor MD     RECORDS OBTAINED:  Records of the patient's history including those obtained from the referring provider were reviewed and summarized in detail.    HISTORY OF PRESENT ILLNESS:  The patient is a 37 y.o. year old female with hypothyroidism, arthritis, significant alcohol use, and cigarette smoking who presented today for initial evaluation at the multimodality lung clinic at the [facility] because of abnormal CT scan examination. The patient is referred by ER attending, Jose J Taylor MD.    The patient is accompanied by her sister today to the clinic.    I reviewed her CT scan images with Thoracic Surgeon, Seth Rios MD, and Radiation Oncologist, Andrea Mays MD, prior to seeing the patient. This is the first time that I see this patient.     The patient reports that she had significant epigastric pain recently which prompted the ER visit on 03/30/2023. She had CT scan examination leading to the discovery of bilateral pulmonary nodules. She was also found having urinary tract infection although she was asymptomatic, she denies fever, sweating, chills, no dysuria or hematuria at that time. Nevertheless she was started on cephalexin.     The patient reports she has no primary care physician for the past 2 years. She used to take Valium because of her anxiety, however due to lack of primary care physician and not prescribed medication, she has been drinking a lot of alcohol, 1 pint a day for close to a year or maybe even longer. The patient reports she has generalized body achiness and joint pain, however she is not having access to pain medication. She used to get those medicines from her primary care physician.    The patient also reports that  in 02/2023 she was prescribed with tapering dose of steroids because of arthralgia, and has been off for now. She also takes Mobic for arthralgia.    Laboratory study on 03/30/2023 reported normal hemoglobin 14.0, however macrocytosis .8 with MCHC 34.3, normal platelets 207,000 and WBC 6030, including neutrophils 3710, lymphocytes 1880. She had normal lipase, unremarkable CMP except sodium 135, otherwise normal electrolytes, renal function creatinine 0.97 and normal liver function panel.     CT scan of abdomen and pelvis on 03/30/2023 reported left lung base pulmonary nodule 10 mm, 6 mm and also an 8 mm nodule in the right middle lobe and 4 mm nodule in the right lobe. There is also a 10 mm nodule in the right lung base. Those nodules are new compared to the CT scan from 01/23/2020. The abdominal organs seem benign including liver, gallbladder, spleen, pancreas, adrenal glands and kidneys. Aorta is also normal and no lymphadenopathy. Appendix is normal, bowel is normal.     The patient subsequently had dedicated chest CT scan with IV contrast, which reported at least 15 nodules in the right lung measuring up to 1 cm in diameters, and at least 6 nodules in the left lung also measuring up to 1 cm in diameter. Thyroid gland was normal, aorta was normal and there is no adenopathy. Bone windows are also unremarkable.     Upon questioning, the patient reports no weight loss, denies fever, sweating. She continues to smoke cigarettes about 1 pack a day, she started smoking in her teenage years. Again she has been drinking alcohol significantly about 1 pint a day for even more than a year.     The patient also has significant family history of malignancy, see the family history section.       Past Medical History:   Diagnosis Date    ADHD (attention deficit hyperactivity disorder)     Anxiety     Arthritis     Hypothyroidism     Low back pain     Migraine headache      Past Surgical History:   Procedure Laterality  Date    WISDOM TOOTH EXTRACTION         MEDICATIONS    Current Outpatient Medications:     cephalexin (KEFLEX) 500 MG capsule, Take 1 capsule by mouth 4 (Four) Times a Day., Disp: 28 capsule, Rfl: 0    dicyclomine (BENTYL) 20 MG tablet, Take 1 tablet by mouth Every 8 (Eight) Hours As Needed (abdominal cramps/pain)., Disp: 20 tablet, Rfl: 0    HYDROcodone-acetaminophen (NORCO) 5-325 MG per tablet, Take 1 tablet by mouth Every 6 (Six) Hours As Needed for Moderate Pain., Disp: 12 tablet, Rfl: 0    meloxicam (MOBIC) 15 MG tablet, Take 1 tablet by mouth Daily., Disp: 30 tablet, Rfl: 5    omeprazole (priLOSEC) 40 MG capsule, Take 1 capsule by mouth Daily., Disp: 90 capsule, Rfl: 3    ondansetron ODT (ZOFRAN-ODT) 4 MG disintegrating tablet, Place 1 tablet on the tongue Every 8 (Eight) Hours As Needed for Nausea or Vomiting., Disp: 20 tablet, Rfl: 0    predniSONE (DELTASONE) 10 MG tablet, Take 1 tablet by mouth Daily. Take 10 mg 3 times a day for 3 days then 10 mg twice a day for 3 days then 10 mg a day for 3 days.  Following completion of the prednisone begin meloxicam 15 mg daily, Disp: 18 tablet, Rfl: 0    ALLERGIES:   No Known Allergies    SOCIAL HISTORY:       Social History     Socioeconomic History    Marital status:    Tobacco Use    Smoking status: Every Day     Packs/day: 0.50     Years: 15.00     Pack years: 7.50     Types: Cigarettes    Smokeless tobacco: Never   Vaping Use    Vaping Use: Never used   Substance and Sexual Activity    Alcohol use: Yes     Alcohol/week: 9.0 standard drinks     Types: 3 Glasses of wine, 6 Cans of beer per week    Drug use: No    Sexual activity: Defer         FAMILY HISTORY:  Family History   Problem Relation Age of Onset    Fibromyalgia Mother     Hypertension Mother     Lupus Mother     Colon polyps Mother     Hypertension Father     Colon polyps Father     Colon cancer Neg Hx    MGM breast cancer twice, at age 50s and 60s,  from other cause.   MGM brain cancer and  "prostate cancer   M aunt breast cancer age 55, cervical cancer age 55.   PGM breast cancer twice age late 30s nd late 50's, and primary brain cancer in the between, resected.   PGGM breast cancer with bone mets, in her 40s, no details       REVIEW OF SYSTEMS:  Review of Systems see HPI              Vitals:    04/04/23 0930   BP: 138/92   Pulse: 83   Temp: 98 °F (36.7 °C)   SpO2: 97%   Weight: 115 kg (254 lb)   Height: 167.6 cm (66\")     Current Status 4/4/2023   ECOG score 0      PHYSICAL EXAM:      CONSTITUTIONAL:  Vital signs reviewed.  Well-developed female with BMI 41. no distress, looks comfortable.  EYES:  Conjunctivae and lids unremarkable.    EARS,NOSE,MOUTH,THROAT:  Ears and nose appear unremarkable.  Patient wears mask due to the pandemic coronavirus infection.   RESPIRATORY:  Normal respiratory effort.  Lungs clear to auscultation bilaterally.  CARDIOVASCULAR:  Normal S1, S2.  No murmurs rubs or gallops.  No significant lower extremity edema.  GASTROINTESTINAL: Abdomen appears unremarkable.  Nontender.  No hepatomegaly.  No splenomegaly.  LYMPHATIC:  No cervical, supraclavicular, axillary lymphadenopathy.  MUSCULOSKELETAL:  Unremarkable digits/nails.  No cyanosis or clubbing.  Trace edema in both legs around the ankles.  There is also tenderness at the lateral side of the left leg however no palpable mass.  No distended veins.  SKIN:  Warm.  No rashes.  PSYCHIATRIC:  Normal judgment and insight.  Normal mood and affect.      RECENT LABS:        WBC   Date Value Ref Range Status   03/30/2023 6.03 3.40 - 10.80 10*3/mm3 Final   01/23/2020 6.33 3.40 - 10.80 10*3/mm3 Final   02/14/2018 5.80 4.50 - 10.70 10*3/mm3 Final   09/20/2016 6.26 4.50 - 10.70 10*3/mm3 Final   09/29/2015 5.17 4.50 - 10.70 K/Cumm Final     Hemoglobin   Date Value Ref Range Status   03/30/2023 14.0 12.0 - 15.9 g/dL Final   01/23/2020 13.2 12.0 - 15.9 g/dL Final   02/14/2018 13.0 11.9 - 15.5 g/dL Final   09/20/2016 13.9 11.9 - 15.5 g/dL " Final   09/29/2015 12.5 11.9 - 15.5 g/dL Final     Platelets   Date Value Ref Range Status   03/30/2023 207 140 - 450 10*3/mm3 Final   01/23/2020 206 140 - 450 10*3/mm3 Final   02/14/2018 230 140 - 500 10*3/mm3 Final   09/20/2016 234 140 - 500 10*3/mm3 Final   09/29/2015 204 140 - 500 K/Cumm Final       Assessment & Plan       ASSESSMENT: Multiple pulmonary nodules in both lungs. Those are highly suspicious for metastatic cancer of unknown primary. We did not see any evidence of primary lung cancer. Discussed with the patient, we recommended to obtain full body PET scan evaluation. We reviewed images with Thoracic Surgeon, Seth Rios MD, and Radiation Oncology, Andrea Mays MD, that certainly all of those pulmonary nodules are suspicious and they are not the best area to have biopsy. The differential diagnosis here is including metastatic disease of unknown primary which we are going to try to figure out, versus sarcoidosis. Certainly we need to have tissue diagnosis but we are trying to get PET scan first to guide us where to do the biopsy. Pulmonary nodules are relatively small and not peripheral, biopsy could carry significant risk for pneumothorax and may not be successful due to small size.     Discussed with the patient and her sister, and she voiced understanding and is agreeable.     PLAN:  1. Obtain fully body PET scan. She does have some pain in the right lower leg by examination, however I do not see or feel a tumor.   2. I will decide where to biopsy once PET scan results are available.   3. We will arrange for the patient to come back in 2 weeks for reevaluation. The appointment could be rescheduled if needed to.        2.  RBC macrocytosis without anemia.  Etiology is not clear but I suspect this patient may have deficiency of B12 or folic acid because of her alcohol abuse.  I asked patient to take oral vitamin B12 at 1000 mcg daily and folic acid 1 mg daily.  Both benign without side effects and  very cheap to use.  She can buy OTC product.        We reviewed images with Thoracic Surgeon, Seth Rios MD, and Radiation Oncology, Andrea Mays MD,      RAFAELA MEDINA M.D., Ph.D.    4/4/2023.        CC:    Seth Rios MD,   Andrea Mays MD,

## 2023-04-04 NOTE — PROGRESS NOTES
"Chief Complaint  Multiple bilateral pulmonary nodules  Subjective        Bessy Mi presents to [facility]  History of Present Illness  Ms. mi is a pleasant 37-year-old lady who presents today for evaluation of multiple bilateral pulmonary nodules.  She recently had a visit to the emergency department due to a GI distress issue and underwent a CT chest abdomen pelvis which ultimately revealed multiple bilateral pulmonary nodules.  She is a current smoker and has smoked approximately a pack a day since she was 14 years old, for a pack year history of about 23 years.  She denies any symptoms.  She denies any fever, chills and/or shortness of breath.  She denies any hemoptysis or hematemesis.  She denies any pain.  She denies any neurologic changes.    Objective   Vital Signs:  /92   Pulse 83   Temp 98 °F (36.7 °C)   Ht 167.6 cm (66\")   Wt 115 kg (254 lb)   SpO2 97%   BMI 41.00 kg/m²   Estimated body mass index is 41 kg/m² as calculated from the following:    Height as of this encounter: 167.6 cm (66\").    Weight as of this encounter: 115 kg (254 lb).           Physical Exam  Vitals reviewed.   Constitutional:       Appearance: Normal appearance.   HENT:      Head: Normocephalic.   Cardiovascular:      Rate and Rhythm: Normal rate and regular rhythm.      Pulses: Normal pulses.      Heart sounds: Normal heart sounds.   Pulmonary:      Effort: Pulmonary effort is normal.      Breath sounds: Normal breath sounds.   Musculoskeletal:         General: Normal range of motion.      Cervical back: Normal range of motion.   Skin:     General: Skin is warm.      Capillary Refill: Capillary refill takes less than 2 seconds.   Neurological:      General: No focal deficit present.      Mental Status: She is alert and oriented to person, place, and time.   Psychiatric:         Mood and Affect: Mood normal.         Behavior: Behavior normal.         Thought Content: Thought " content normal.         Judgment: Judgment normal.        Result Review :  CT scan from 3/30/2023 was visualized by myself.  Multiple bilateral pulmonary nodules are noted.           Assessment and Plan   Diagnoses and all orders for this visit:    1. Multiple pulmonary nodules (Primary)    Ms. choi is a pleasant 37-year-old lady who presents for incidentally found multiple bilateral pulmonary nodules.  She is an active smoker.  She does not have any personal history of cancer.  She denies any symptoms at this time.  We will obtain a whole-body CT/PET scan to evaluate for possible metabolic activity in these nodules.  Once this is resulted we will see her in follow-up to see if these need to be biopsied and/or if there is another lesion in question.       I spent 45 minutes caring for Bessy on this date of service. This time includes time spent by me in the following activities:preparing for the visit, reviewing tests, obtaining and/or reviewing a separately obtained history, performing a medically appropriate examination and/or evaluation , counseling and educating the patient/family/caregiver, ordering medications, tests, or procedures, referring and communicating with other health care professionals , documenting information in the medical record, independently interpreting results and communicating that information with the patient/family/caregiver and care coordination  Follow Up   No follow-ups on file.  Patient was given instructions and counseling regarding her condition or for health maintenance advice. Please see specific information pulled into the AVS if appropriate.

## 2023-04-11 ENCOUNTER — HOSPITAL ENCOUNTER (OUTPATIENT)
Dept: PET IMAGING | Facility: HOSPITAL | Age: 38
Discharge: HOME | End: 2023-04-11
Payer: COMMERCIAL

## 2023-04-11 DIAGNOSIS — R91.8 MULTIPLE PULMONARY NODULES DETERMINED BY COMPUTED TOMOGRAPHY OF LUNG: ICD-10-CM

## 2023-04-11 LAB — GLUCOSE BLDC GLUCOMTR-MCNC: 94 MG/DL (ref 70–130)

## 2023-04-11 PROCEDURE — 78815 PET IMAGE W/CT SKULL-THIGH: CPT

## 2023-04-11 PROCEDURE — 0 FLUDEOXYGLUCOSE F18 SOLUTION: Performed by: INTERNAL MEDICINE

## 2023-04-11 PROCEDURE — 82962 GLUCOSE BLOOD TEST: CPT

## 2023-04-11 PROCEDURE — A9552 F18 FDG: HCPCS | Performed by: INTERNAL MEDICINE

## 2023-04-11 RX ADMIN — FLUDEOXYGLUCOSE F18 1 DOSE: 300 INJECTION INTRAVENOUS at 07:28

## 2023-04-17 ENCOUNTER — TELEPHONE (OUTPATIENT)
Dept: OTHER | Facility: HOSPITAL | Age: 38
End: 2023-04-17
Payer: COMMERCIAL

## 2023-04-17 NOTE — TELEPHONE ENCOUNTER
Call from patient.  She wants to know if she can have a phone visit to go over her test results instead of an in person visit tomorrow.  Message to Dr. Rios and Dr. Johnson regarding this.  Dr. Rios responded that he will call the patient.  Called patient. Explained Dr. Rios will call her with the test results/next steps. Patient verbalized understanding.

## 2023-04-18 ENCOUNTER — TELEPHONE (OUTPATIENT)
Dept: ONCOLOGY | Facility: CLINIC | Age: 38
End: 2023-04-18
Payer: COMMERCIAL

## 2023-04-18 DIAGNOSIS — R91.8 MULTIPLE PULMONARY NODULES DETERMINED BY COMPUTED TOMOGRAPHY OF LUNG: Primary | ICD-10-CM

## 2023-04-18 NOTE — TELEPHONE ENCOUNTER
I called and spoke with the patient today reporting she had a negative PET scan.  The radiologist recommended follow-up chest CT scan in 3 months for reassessment.  We will arrange that.  Patient voiced understanding and agreeable.    I discussed with the thoracic surgeon Dr. Seth Rios and we both concured this is a good recommendation.     RAFAELA MEDINA M.D., Ph.D.        Study Result    Narrative & Impression   F-18 FDG PET SKULL BASE TO MID THIGH WITH PET CT FUSION.     HISTORY: 37-year-old female with multiple pulmonary nodules.     TECHNIQUE: Radiation dose reduction techniques were utilized, including  automated exposure control and exposure modulation based on body size.   Blood glucose level at time of injection was 94 mg/dL. 6.1 mCi of F-18  FDG were injected and PET was performed from skull base to mid thigh. CT  was obtained for localization and attenuation correction. Time at  injection 7:15 AM. PET start time 8:35 AM. Compared with CTs 03/30/2023.     FINDINGS: A maximal SUV of the blood pool range is from 1. 7-3. There  are no hypermetabolic pulmonary nodules. The largest nodule measures 1.1  cm at the left lung base and is photopenic. There is no hypermetabolic  lymphadenopathy within the chest and there is no suspicious  hypermetabolic activity at the neck. There is a fairly typical speckled  pattern of activity throughout the liver. There is no suspicious  hypermetabolic activity within the abdomen or pelvis. There is  hypermetabolic activity along the endometrium which is likely  physiological given the patient's age. There is nonspecific bowel  activity. There is no hypermetabolic lymphadenopathy. No suspicious bone  activity is seen.     IMPRESSION:  All of the pulmonary nodules are photopenic and there is no  hypermetabolic lymphadenopathy. The pulmonary nodules at the lower lobes  were not present on a previous CT abdomen from 01/06/2015. Reevaluation  is recommended with a followup  chest CT in 3 months.

## 2023-07-11 ENCOUNTER — TELEPHONE (OUTPATIENT)
Dept: ONCOLOGY | Facility: CLINIC | Age: 38
End: 2023-07-11

## 2023-07-11 NOTE — TELEPHONE ENCOUNTER
Caller: Shmuel Bessy Mitra    Relationship: Self    Best call back number: [phone]    What is the best time to reach you: ANYTIME    AND CAN LEAVE VOICEMAIL IF UNABLE TO REACH     Who are you requesting to speak with (clinical staff, provider,  specific staff member): TAVARES       What was the call regarding: WANTED TO SEE ABOUT DOING A VIRTUAL OR PHONE CALL VISIT FOR 3 MONTH FOLLOW UP 07/13 INSTEAD OF COMING IN .    JUST HAD COMPETED SCAN 07/05 RESULTS ARE UPLOADED.         Is it okay if the provider responds through [employer]: YES

## 2023-07-13 PROBLEM — R19.7 DIARRHEA: Status: ACTIVE | Noted: 2023-07-13

## 2023-07-13 PROBLEM — R06.00 DYSPNEA: Status: ACTIVE | Noted: 2023-07-13

## 2023-07-13 NOTE — TELEPHONE ENCOUNTER
PER [facility] THEY WILL SEND THE PATIENT A LINK TO HER PHONE NUMBER [phone] FOR HER F/U APPT WITH DR. MEDINA.      [facility] PLEASE CALL HER TO R/S HER LAB APPT TO [facility] SHE WANTS TO GO TODAY.    CALL HER BACK [phone]   [facility] Instructions: By selecting yes to the question below the MAUC number will be added into the note.  This will be calculated automatically based on the diagnosis chosen, the size entered, the body zone selected (H,M,L) and the specific indications you chose. You will also have the option to override the Mohs AUC if you disagree with the automatically calculated number and this option is found in the Case Summary tab.

## 2023-07-18 PROBLEM — K52.9 CHRONIC DIARRHEA: Status: ACTIVE | Noted: 2023-07-18

## 2023-07-18 PROBLEM — R19.7 DIARRHEA: Status: RESOLVED | Noted: 2023-07-13 | Resolved: 2023-07-18

## 2023-07-19 PROBLEM — K21.9 GASTROESOPHAGEAL REFLUX DISEASE WITHOUT ESOPHAGITIS: Status: ACTIVE | Noted: 2023-07-19

## 2023-07-19 PROBLEM — K58.0 IRRITABLE BOWEL SYNDROME WITH DIARRHEA: Status: ACTIVE | Noted: 2023-07-19

## 2023-07-19 PROBLEM — R11.2 NAUSEA AND VOMITING: Status: ACTIVE | Noted: 2023-07-19

## 2023-07-19 PROBLEM — K92.1 HEMATOCHEZIA: Status: ACTIVE | Noted: 2023-07-19

## 2023-07-19 NOTE — H&P (VIEW-ONLY)
Bessy Mi is a 37 y.o. female with PMH of recently diagnosed bilateral pulmonary nodules seen on CT imaging & actively undergoing evaluation, Hypothyroidism, EtOH Use Disorder who presents with   Chief Complaint   Patient presents with    Abdominal Pain     RUQ    Diarrhea       Subjective     # Chronic Diarrhea   # Hematochezia   - Ongoing for 1 year. Further described as 3 to 5 loose bowel movements per day.  - Associated with cramping abdominal pain that would worsen prior to each BM.   - Also reports hematochezia further described as bright red blood mixed in with the stool, last occurred on Saturday (7/15/23).   - No previous EGD or colonoscopy.   - Mother diagnosed with IBD (patient uncertain if CD or UC). Maternal grandmother diagnosed with Crohn's disease.   - Smokes 1/4 ppd of cigarettes and is actively trying to quit by weaning herself down.     # Dyspepsia   - Ongoing for 1 year. Further described as epigastric pain that last 20 minutes per episode. Occurs daily.   - Previously had similar pain 10 yrs ago but it had resolved.   - Was seen in the ER in 3/2023 for these symptoms. CT A/P was obtained that showed no acute GI processes but did show bilateral pulmonary nodules concerning for metastatic disease.   - Was previously seen in clinic in 2020 for dyspepsia. Was started on Omeprazole 40 mg QD and had planned to have EGD to further evaluate but was never done.   - Reports infrequent NSAID -- two to three times per month.   - No previous EGD.   - Noted normal Hgb from labs in 3/2023.     # GERD   # Nausea and vomiting  - Reports significant heartburn occurring daily associated with significant N/V upon awakening in the morning.   - Reports Omeprazole did not help in the past. Has not tried Protonix previously.        Past Medical History:   Diagnosis Date    ADHD (attention deficit hyperactivity disorder)     Anxiety     Arthritis     Degeneration of lumbar intervertebral disc 2003     Hypothyroidism     Low back pain     Migraine headache        Social History     Socioeconomic History    Marital status:      Spouse name: Rakesh    Number of children: 2   Tobacco Use    Smoking status: Every Day     Packs/day: 0.25     Years: 15.00     Pack years: 3.75     Types: Cigarettes     Passive exposure: Current (>15 yrs ago)    Smokeless tobacco: Never    Tobacco comments:     N/A   Vaping Use    Vaping Use: Never used   Substance and Sexual Activity    Alcohol use: Yes     Alcohol/week: 9.0 standard drinks     Types: 3 Glasses of wine, 6 Cans of beer per week    Drug use: No    Sexual activity: Defer         Current Outpatient Medications:     dicyclomine (BENTYL) 20 MG tablet, Take 1 tablet by mouth 4 (Four) Times a Day Before Meals & at Bedtime As Needed for Abdominal Cramping for up to 360 days., Disp: 120 tablet, Rfl: 11    loperamide (IMODIUM A-D) 2 MG tablet, Take 2 tablets by mouth 4 (Four) Times a Day As Needed for Diarrhea., Disp: 240 tablet, Rfl: 11    omeprazole (priLOSEC) 20 MG capsule, Take 1 capsule by mouth As Needed., Disp: , Rfl:     ondansetron ODT (ZOFRAN-ODT) 8 MG disintegrating tablet, Place 1 tablet on the tongue Every 8 (Eight) Hours As Needed for Nausea or Vomiting., Disp: 90 tablet, Rfl: 11    pantoprazole (PROTONIX) 40 MG EC tablet, Take 1 tablet by mouth 2 (Two) Times a Day Before Meals., Disp: 180 tablet, Rfl: 3    riFAXIMin (XIFAXAN) 550 MG tablet, Take 1 tablet by mouth Every 8 (Eight) Hours for 14 days., Disp: 42 tablet, Rfl: 0    Objective   Vitals:    07/19/23 1443   BP: 118/70         07/19/23  1443   Weight: 116 kg (256 lb 6.4 oz)     Body mass index is 41.38 kg/m².      Physical Exam  Constitutional:       Appearance: Normal appearance.   HENT:      Head: Normocephalic and atraumatic.   Eyes:      Extraocular Movements: Extraocular movements intact.      Pupils: Pupils are equal, round, and reactive to light.   Cardiovascular:      Rate and Rhythm: Normal rate  and regular rhythm.      Heart sounds: Normal heart sounds.   Pulmonary:      Effort: Pulmonary effort is normal.      Breath sounds: Normal breath sounds.   Abdominal:      General: Abdomen is flat. Bowel sounds are normal.      Palpations: Abdomen is soft.   Neurological:      Mental Status: She is alert.   Psychiatric:         Mood and Affect: Mood normal.         Behavior: Behavior normal.       WBC   Date Value Ref Range Status   03/30/2023 6.03 3.40 - 10.80 10*3/mm3 Final     RBC   Date Value Ref Range Status   03/30/2023 3.93 3.77 - 5.28 10*6/mm3 Final     Hemoglobin   Date Value Ref Range Status   03/30/2023 14.0 12.0 - 15.9 g/dL Final     Hematocrit   Date Value Ref Range Status   03/30/2023 40.8 34.0 - 46.6 % Final     MCV   Date Value Ref Range Status   03/30/2023 103.8 (H) 79.0 - 97.0 fL Final     MCH   Date Value Ref Range Status   03/30/2023 35.6 (H) 26.6 - 33.0 pg Final     MCHC   Date Value Ref Range Status   03/30/2023 34.3 31.5 - 35.7 g/dL Final     RDW   Date Value Ref Range Status   03/30/2023 13.4 12.3 - 15.4 % Final     RDW-SD   Date Value Ref Range Status   03/30/2023 51.8 37.0 - 54.0 fl Final     MPV   Date Value Ref Range Status   03/30/2023 9.8 6.0 - 12.0 fL Final     Platelets   Date Value Ref Range Status   03/30/2023 207 140 - 450 10*3/mm3 Final     Neutrophil %   Date Value Ref Range Status   03/30/2023 61.4 42.7 - 76.0 % Final     Lymphocyte %   Date Value Ref Range Status   03/30/2023 31.2 19.6 - 45.3 % Final     Monocyte %   Date Value Ref Range Status   03/30/2023 6.5 5.0 - 12.0 % Final     Eosinophil %   Date Value Ref Range Status   03/30/2023 0.2 (L) 0.3 - 6.2 % Final     Basophil %   Date Value Ref Range Status   03/30/2023 0.5 0.0 - 1.5 % Final     Immature Grans %   Date Value Ref Range Status   03/30/2023 0.2 0.0 - 0.5 % Final     Neutrophils, Absolute   Date Value Ref Range Status   03/30/2023 3.71 1.70 - 7.00 10*3/mm3 Final     Lymphocytes, Absolute   Date Value Ref Range  Status   03/30/2023 1.88 0.70 - 3.10 10*3/mm3 Final     Monocytes, Absolute   Date Value Ref Range Status   03/30/2023 0.39 0.10 - 0.90 10*3/mm3 Final     Eosinophils, Absolute   Date Value Ref Range Status   03/30/2023 0.01 0.00 - 0.40 10*3/mm3 Final     Basophils, Absolute   Date Value Ref Range Status   03/30/2023 0.03 0.00 - 0.20 10*3/mm3 Final     Immature Grans, Absolute   Date Value Ref Range Status   03/30/2023 0.01 0.00 - 0.05 10*3/mm3 Final     nRBC   Date Value Ref Range Status   03/30/2023 0.0 0.0 - 0.2 /100 WBC Final       Lab Results   Component Value Date    GLUCOSE 90 03/30/2023    BUN 12 03/30/2023    CREATININE 0.97 03/30/2023    EGFRIFNONA 75 01/23/2020    EGFRIFAFRI 91 08/08/2018    BCR 12.4 03/30/2023    CO2 21.8 (L) 03/30/2023    CALCIUM 8.7 03/30/2023    PROTENTOTREF 6.2 08/08/2018    ALBUMIN 3.5 03/30/2023    LABIL2 1.8 08/08/2018    AST 23 03/30/2023    ALT 24 03/30/2023         Imaging Results (Last 7 Days)       ** No results found for the last 168 hours. **              Assessment & Plan   Diagnoses and all orders for this visit:    1. Chronic diarrhea (Primary)  Assessment & Plan:  - Order EGD and colonoscopy to further evaluate   - Start PRN Imodium and PRN Bentyl  - Order TSH and Celiac Panel   - Order fecal calprotectin and CRP given concern for underlying IBD     Orders:  -     Celiac Comprehensive Panel  -     TSH  -     loperamide (IMODIUM A-D) 2 MG tablet; Take 2 tablets by mouth 4 (Four) Times a Day As Needed for Diarrhea.  Dispense: 240 tablet; Refill: 11  -     dicyclomine (BENTYL) 20 MG tablet; Take 1 tablet by mouth 4 (Four) Times a Day Before Meals & at Bedtime As Needed for Abdominal Cramping for up to 360 days.  Dispense: 120 tablet; Refill: 11  -     Calprotectin, Fecal - Stool, Per Rectum  -     C-reactive Protein  -     Case Request; Standing  -     Follow Anesthesia Guidelines / Protocol; Future  -     Case Request    2. Dyspepsia  Assessment & Plan:  - EGD to further  evaluate   - Start Protonix 40 mg BID AC (had no relief with Omeprazole previously).     Orders:  -     pantoprazole (PROTONIX) 40 MG EC tablet; Take 1 tablet by mouth 2 (Two) Times a Day Before Meals.  Dispense: 180 tablet; Refill: 3  -     Case Request; Standing  -     Case Request    3. Irritable bowel syndrome with diarrhea  Assessment & Plan:  - Start Rifaximin 550 mg TID for 14 days     Orders:  -     riFAXIMin (XIFAXAN) 550 MG tablet; Take 1 tablet by mouth Every 8 (Eight) Hours for 14 days.  Dispense: 42 tablet; Refill: 0    4. Hematochezia  Assessment & Plan:  - Last occurred on 7/16/23. Described as moderate volume BRB mixed in with her stool.   - Colonoscopy to further evaluate     Orders:  -     Case Request; Standing  -     Follow Anesthesia Guidelines / Protocol; Future  -     Case Request    5. Gastroesophageal reflux disease without esophagitis  Assessment & Plan:  - Reports uncontrolled heartburn.   - Start Protonix 40 mg BID AC.     Orders:  -     pantoprazole (PROTONIX) 40 MG EC tablet; Take 1 tablet by mouth 2 (Two) Times a Day Before Meals.  Dispense: 180 tablet; Refill: 3    6. Nausea and vomiting, unspecified vomiting type  Assessment & Plan:  - Start PRN Zofran 8 mg q8h     Orders:  -     ondansetron ODT (ZOFRAN-ODT) 8 MG disintegrating tablet; Place 1 tablet on the tongue Every 8 (Eight) Hours As Needed for Nausea or Vomiting.  Dispense: 90 tablet; Refill: 11    Other orders  -     Obtain Informed Consent; Standing  -     Verify Bowel Prep Was Successful; Standing  -     Give Tap Water Enema If Bowel Prep Insufficient; Standing

## 2023-07-21 ENCOUNTER — ANESTHESIA EVENT (OUTPATIENT)
Dept: PERIOP | Facility: HOSPITAL | Age: 38
End: 2023-07-21
Payer: COMMERCIAL

## 2023-07-24 ENCOUNTER — HOSPITAL ENCOUNTER (OUTPATIENT)
Facility: HOSPITAL | Age: 38
Setting detail: HOSPITAL OUTPATIENT SURGERY
Discharge: HOME | End: 2023-07-24
Attending: STUDENT IN AN ORGANIZED HEALTH CARE EDUCATION/TRAINING PROGRAM | Admitting: STUDENT IN AN ORGANIZED HEALTH CARE EDUCATION/TRAINING PROGRAM
Payer: COMMERCIAL

## 2023-07-24 ENCOUNTER — ANESTHESIA (OUTPATIENT)
Dept: PERIOP | Facility: HOSPITAL | Age: 38
End: 2023-07-24
Payer: COMMERCIAL

## 2023-07-24 ENCOUNTER — LAB (OUTPATIENT)
Dept: LAB | Facility: HOSPITAL | Age: 38
End: 2023-07-24
Payer: COMMERCIAL

## 2023-07-24 VITALS
OXYGEN SATURATION: 100 % | BODY MASS INDEX: 40.02 KG/M2 | HEART RATE: 57 BPM | SYSTOLIC BLOOD PRESSURE: 125 MMHG | DIASTOLIC BLOOD PRESSURE: 85 MMHG | RESPIRATION RATE: 11 BRPM | HEIGHT: 66 IN | WEIGHT: 249 LBS | TEMPERATURE: 97.8 F

## 2023-07-24 DIAGNOSIS — K92.1 HEMATOCHEZIA: ICD-10-CM

## 2023-07-24 DIAGNOSIS — R10.13 DYSPEPSIA: ICD-10-CM

## 2023-07-24 DIAGNOSIS — K52.9 CHRONIC DIARRHEA: ICD-10-CM

## 2023-07-24 PROCEDURE — 83993 ASSAY FOR CALPROTECTIN FECAL: CPT | Performed by: STUDENT IN AN ORGANIZED HEALTH CARE EDUCATION/TRAINING PROGRAM

## 2023-07-24 PROCEDURE — 25010000002 PROPOFOL 200 MG/20ML EMULSION: Performed by: NURSE ANESTHETIST, CERTIFIED REGISTERED

## 2023-07-24 PROCEDURE — 25010000002 PROPOFOL 10 MG/ML EMULSION: Performed by: NURSE ANESTHETIST, CERTIFIED REGISTERED

## 2023-07-24 PROCEDURE — 88305 TISSUE EXAM BY PATHOLOGIST: CPT | Performed by: STUDENT IN AN ORGANIZED HEALTH CARE EDUCATION/TRAINING PROGRAM

## 2023-07-24 PROCEDURE — 25010000002 FENTANYL CITRATE (PF) 50 MCG/ML SOLUTION: Performed by: NURSE ANESTHETIST, CERTIFIED REGISTERED

## 2023-07-24 RX ORDER — MAGNESIUM HYDROXIDE 1200 MG/15ML
LIQUID ORAL AS NEEDED
Status: DISCONTINUED | OUTPATIENT
Start: 2023-07-24 | End: 2023-07-24 | Stop reason: HOSPADM

## 2023-07-24 RX ORDER — ONDANSETRON 2 MG/ML
4 INJECTION INTRAMUSCULAR; INTRAVENOUS ONCE AS NEEDED
Status: DISCONTINUED | OUTPATIENT
Start: 2023-07-24 | End: 2023-07-24 | Stop reason: HOSPADM

## 2023-07-24 RX ORDER — SODIUM CHLORIDE 0.9 % (FLUSH) 0.9 %
10 SYRINGE (ML) INJECTION AS NEEDED
Status: DISCONTINUED | OUTPATIENT
Start: 2023-07-24 | End: 2023-07-24 | Stop reason: HOSPADM

## 2023-07-24 RX ORDER — FENTANYL CITRATE 50 UG/ML
INJECTION, SOLUTION INTRAMUSCULAR; INTRAVENOUS AS NEEDED
Status: DISCONTINUED | OUTPATIENT
Start: 2023-07-24 | End: 2023-07-24 | Stop reason: SURG

## 2023-07-24 RX ORDER — SODIUM CHLORIDE, SODIUM LACTATE, POTASSIUM CHLORIDE, CALCIUM CHLORIDE 600; 310; 30; 20 MG/100ML; MG/100ML; MG/100ML; MG/100ML
9 INJECTION, SOLUTION INTRAVENOUS CONTINUOUS
Status: DISCONTINUED | OUTPATIENT
Start: 2023-07-24 | End: 2023-07-24 | Stop reason: HOSPADM

## 2023-07-24 RX ORDER — SODIUM CHLORIDE 0.9 % (FLUSH) 0.9 %
10 SYRINGE (ML) INJECTION EVERY 12 HOURS SCHEDULED
Status: DISCONTINUED | OUTPATIENT
Start: 2023-07-24 | End: 2023-07-24 | Stop reason: HOSPADM

## 2023-07-24 RX ORDER — LIDOCAINE HYDROCHLORIDE 10 MG/ML
0.5 INJECTION, SOLUTION EPIDURAL; INFILTRATION; INTRACAUDAL; PERINEURAL ONCE AS NEEDED
Status: DISCONTINUED | OUTPATIENT
Start: 2023-07-24 | End: 2023-07-24 | Stop reason: HOSPADM

## 2023-07-24 RX ORDER — PROPOFOL 10 MG/ML
INJECTION, EMULSION INTRAVENOUS AS NEEDED
Status: DISCONTINUED | OUTPATIENT
Start: 2023-07-24 | End: 2023-07-24 | Stop reason: SURG

## 2023-07-24 RX ORDER — LIDOCAINE HYDROCHLORIDE 20 MG/ML
INJECTION, SOLUTION INFILTRATION; PERINEURAL AS NEEDED
Status: DISCONTINUED | OUTPATIENT
Start: 2023-07-24 | End: 2023-07-24 | Stop reason: SURG

## 2023-07-24 RX ORDER — SODIUM CHLORIDE, SODIUM LACTATE, POTASSIUM CHLORIDE, CALCIUM CHLORIDE 600; 310; 30; 20 MG/100ML; MG/100ML; MG/100ML; MG/100ML
100 INJECTION, SOLUTION INTRAVENOUS CONTINUOUS
Status: DISCONTINUED | OUTPATIENT
Start: 2023-07-24 | End: 2023-07-24 | Stop reason: HOSPADM

## 2023-07-24 RX ORDER — SODIUM CHLORIDE 9 MG/ML
40 INJECTION, SOLUTION INTRAVENOUS AS NEEDED
Status: DISCONTINUED | OUTPATIENT
Start: 2023-07-24 | End: 2023-07-24 | Stop reason: HOSPADM

## 2023-07-24 RX ADMIN — PROPOFOL 200 MG: 10 INJECTION, EMULSION INTRAVENOUS at 14:39

## 2023-07-24 RX ADMIN — PROPOFOL 100 MG: 10 INJECTION, EMULSION INTRAVENOUS at 14:43

## 2023-07-24 RX ADMIN — PROPOFOL 200 MG: 10 INJECTION, EMULSION INTRAVENOUS at 14:36

## 2023-07-24 RX ADMIN — PROPOFOL 100 MG: 10 INJECTION, EMULSION INTRAVENOUS at 14:54

## 2023-07-24 RX ADMIN — SODIUM CHLORIDE, POTASSIUM CHLORIDE, SODIUM LACTATE AND CALCIUM CHLORIDE: 600; 310; 30; 20 INJECTION, SOLUTION INTRAVENOUS at 14:33

## 2023-07-24 RX ADMIN — PROPOFOL 200 MCG/KG/MIN: 10 INJECTION, EMULSION INTRAVENOUS at 14:37

## 2023-07-24 RX ADMIN — LIDOCAINE HYDROCHLORIDE 100 MG: 20 INJECTION, SOLUTION INFILTRATION; PERINEURAL at 14:36

## 2023-07-24 RX ADMIN — PROPOFOL 100 MG: 10 INJECTION, EMULSION INTRAVENOUS at 14:48

## 2023-07-24 RX ADMIN — FENTANYL CITRATE 50 MCG: 50 INJECTION, SOLUTION INTRAMUSCULAR; INTRAVENOUS at 15:00

## 2023-07-24 NOTE — INTERVAL H&P NOTE
"  H&P updated. The patient was examined and the following changes are noted:  hasn't started Rifaximin yet but PA has been approved. No further episodes of hematochezia since last seen.     /98 (BP Location: Left arm, Patient Position: Lying)   Pulse 71   Temp 97.5 °F (36.4 °C) (Oral)   Resp 14   Ht 167.6 cm (66\")   Wt 113 kg (249 lb)   LMP 07/16/2023 (Exact Date)   SpO2 98%   BMI 40.19 kg/m²        "

## 2023-07-24 NOTE — ANESTHESIA POSTPROCEDURE EVALUATION
Patient: Bessy Mi    Procedure Summary       Date: 07/24/23 Room / Location: [facility] ENDOSCOPY 2 /  [facility] OR    Anesthesia Start: 1433 Anesthesia Stop: 1521    Procedures:       ESOPHAGOGASTRODUODENOSCOPY WITH BIOPSY (Esophagus)      COLONOSCOPY WITH BIOPSY Diagnosis:       Chronic diarrhea      Dyspepsia      Hematochezia      (Chronic diarrhea [K52.9])      (Dyspepsia [R10.13])      (Hematochezia [K92.1])    Surgeons: Luis Enrique Wade MD Provider: Juan Carlos Liu CRNA    Anesthesia Type: MAC ASA Status: 3            Anesthesia Type: MAC    Vitals  Vitals Value Taken Time   /85 07/24/23 1600   Temp 97.8 °F (36.6 °C) 07/24/23 1550   Pulse 62 07/24/23 1606   Resp 11 07/24/23 1600   SpO2 98 % 07/24/23 1606   Vitals shown include unvalidated device data.        Post Anesthesia Care and Evaluation    Patient location during evaluation: bedside  Patient participation: complete - patient participated  Level of consciousness: awake and alert  Pain score: 0  Pain management: adequate    Airway patency: patent  Anesthetic complications: No anesthetic complications  PONV Status: none  Cardiovascular status: acceptable  Respiratory status: acceptable  Hydration status: acceptable  No anesthesia care post op

## 2023-07-24 NOTE — BRIEF OP NOTE
ESOPHAGOGASTRODUODENOSCOPY WITH BIOPSY, COLONOSCOPY  Progress Note    Bessy Mi  7/24/2023    Pre-op Diagnosis:   Chronic diarrhea [K52.9]  Dyspepsia [R10.13]  Hematochezia [K92.1]       Post-Op Diagnosis Codes:     * Chronic diarrhea [K52.9]     * Dyspepsia [R10.13]     * Hematochezia [K92.1]    Procedure/CPT® Codes:        Procedure(s):  ESOPHAGOGASTRODUODENOSCOPY WITH BIOPSY  COLONOSCOPY              Surgeon(s):  Luis Enrique Wade MD    Anesthesia: Monitored Anesthesia Care    Staff:   Circulator: Shanelle Garcia RN  Scrub Person: Chris Coello; Shania Emanuel         Estimated Blood Loss: minimal    Urine Voided: * No values recorded between 7/24/2023  2:32 PM and 7/24/2023  2:52 PM *    Specimens:                Specimens       ID Source Type Tests Collected By Collected At Frozen?    A Gastric, Antrum Tissue TISSUE PATHOLOGY EXAM   Luis Enrique Wade MD 7/24/23 [id number]     Description: Biopsies    This specimen was not marked as sent.                  Drains: * No LDAs found *    Findings:     EGD Findings:   - Normal esophagus   - Erosive gastritis s/p biopsy for H pylori   - Duodenitis of duodenal bulb     Colonoscopy Findings:  - Normal colonic mucosa. Random colon biopsies obtained to evaluate for microscopic colitis.   - Small external hemorrhoids were seen during retroflexion.         Complications: None           Luis Enrique Wade MD     Date: 7/24/2023  Time: 14:52 EDT

## 2023-07-24 NOTE — ANESTHESIA PREPROCEDURE EVALUATION
Anesthesia Evaluation     Patient summary reviewed and Nursing notes reviewed   NPO Solid Status: > 8 hours  NPO Liquid Status: > 8 hours           Airway   Mallampati: III  TM distance: >3 FB  Large neck circumference and Possible difficult intubation  Dental - normal exam     Pulmonary - normal exam   (+) a smoker Former,shortness of breath    ROS comment: Multiple pulmonary nodules determined by computed tomography of lung  Cardiovascular - normal exam  Exercise tolerance: poor (<4 METS)    ECG reviewed    (+) hyperlipidemia    ROS comment: Previous ECG: no previous ECG available  Comments: Sinus rhythm, rate of 65, normal axis, no acute ST elevation or  depression.  Interpreted by Jose J Taylor MD on 3/30/2023  1:52 PM EDT              Neuro/Psych  (+) headaches, psychiatric history Anxiety and ADHD  GI/Hepatic/Renal/Endo    (+) GERD poorly controlled    Musculoskeletal     (+) radiculopathy Right upper extremity and Left upper extremity  Abdominal  - normal exam   Substance History   (+) alcohol use (pint liquor per day)     OB/GYN          Other   arthritis,                     Anesthesia Plan    ASA 3     MAC     intravenous induction     Anesthetic plan, risks, benefits, and alternatives have been provided, discussed and informed consent has been obtained with: patient.    Use of blood products discussed with patient  Consented to blood products.      CODE STATUS:

## 2023-07-26 LAB
LAB AP CASE REPORT: NORMAL
PATH REPORT.FINAL DX SPEC: NORMAL
PATH REPORT.GROSS SPEC: NORMAL

## 2023-07-27 ENCOUNTER — TELEPHONE (OUTPATIENT)
Dept: GASTROENTEROLOGY | Facility: CLINIC | Age: 38
End: 2023-07-27
Payer: COMMERCIAL

## 2023-07-27 DIAGNOSIS — B96.81 HELICOBACTER PYLORI GASTRITIS: Primary | ICD-10-CM

## 2023-07-27 DIAGNOSIS — K29.70 HELICOBACTER PYLORI GASTRITIS: Primary | ICD-10-CM

## 2023-07-27 RX ORDER — TETRACYCLINE HYDROCHLORIDE 500 MG/1
500 CAPSULE ORAL 4 TIMES DAILY
Qty: 56 CAPSULE | Refills: 0 | Status: SHIPPED | OUTPATIENT
Start: 2023-07-27 | End: 2023-08-10

## 2023-07-27 RX ORDER — BISMUTH SUBSALICYLATE 262 MG/1
524 TABLET, CHEWABLE ORAL
Qty: 112 TABLET | Refills: 0 | Status: SHIPPED | OUTPATIENT
Start: 2023-07-27 | End: 2023-08-10

## 2023-07-27 RX ORDER — METRONIDAZOLE 500 MG/1
500 TABLET ORAL 3 TIMES DAILY
Qty: 42 TABLET | Refills: 0 | Status: SHIPPED | OUTPATIENT
Start: 2023-07-27 | End: 2023-08-10

## 2023-07-27 NOTE — TELEPHONE ENCOUNTER
Result Notes:      TSH, CRP, CELIAC:    (Please let Miss Cross that her labs looking into her chronic diarrhea were all normal.)    FECAL CALPROTECTIN- 12     EGD findings are normal esophagus, erosive gastritis s/p bx for H pylori, erosive duodenitis s/p bx for celiac disease.     Path -- gastric bx was positive for H Pylori. Duodenal bx was negative for Celiac disease.     Colonoscopy showed external hemorrhoids but otherwise normal. Random colon bx obtained for microscopic colitis.     Path was negative for microscopic colitis.     POC:   - Dyspepsia is likely due to gastritis and duodenitis.   - Order placed for Quadruple therapy to treat H pylori gastritis & duodenitis.   - Hematochezia is likely due to hemorrhoidal bleeding.   - Recently started Rifaximin for IBS-D.     Called pt with results

## 2023-07-27 NOTE — PROGRESS NOTES
Please let Miss Cross know the path results.     EGD performed for chronic diarrhea & dyspepsia. Colonoscopy performed for chronic diarrhea & hematochezia.     EGD findings are normal esophagus, erosive gastritis s/p bx for H pylori, erosive duodenitis s/p bx for celiac disease.     Path -- gastric bx was positive for H Pylori. Duodenal bx was negative for Celiac disease.     Colonoscopy showed external hemorrhoids but otherwise normal. Random colon bx obtained for microscopic colitis.     Path was negative for microscopic colitis.     POC:   - Dyspepsia is likely due to gastritis and duodenitis.   - Order placed for Quadruple therapy to treat H pylori gastritis & duodenitis.   - Hematochezia is likely due to hemorrhoidal bleeding.   - Recently started Rifaximin for IBS-D.

## 2024-01-19 ENCOUNTER — TELEPHONE (OUTPATIENT)
Dept: ONCOLOGY | Facility: CLINIC | Age: 39
End: 2024-01-19
Payer: COMMERCIAL

## 2024-01-19 NOTE — TELEPHONE ENCOUNTER
Called and ranjithm asking patient to call aziza back so that we could get the patient rescheduled for her ct scan before she sees Dr Johnson on Thursday 1/25/24.

## 2024-01-19 NOTE — TELEPHONE ENCOUNTER
----- Message from More Chou RN sent at 1/19/2024 11:45 AM EST -----  Regarding: Reschedule CT scan  Patient needs CT Scan reschedule before follow up on 1/25/24 if possible. Patient cancelled appointment on 1/18/24. Thanks  ----- Message -----  From: Amairani Deleon  Sent: 1/19/2024  11:41 AM EST  To: More Chou RN  Subject: see note below                                   Cancelled ct scan  appt 1/25

## 2024-04-07 ENCOUNTER — HOSPITAL ENCOUNTER (EMERGENCY)
Facility: HOSPITAL | Age: 39
Discharge: HOME | End: 2024-04-07
Attending: EMERGENCY MEDICINE | Admitting: EMERGENCY MEDICINE
Payer: COMMERCIAL

## 2024-04-07 ENCOUNTER — APPOINTMENT (OUTPATIENT)
Dept: GENERAL RADIOLOGY | Facility: HOSPITAL | Age: 39
End: 2024-04-07
Payer: COMMERCIAL

## 2024-04-07 VITALS
HEART RATE: 20 BPM | TEMPERATURE: 97.8 F | DIASTOLIC BLOOD PRESSURE: 84 MMHG | RESPIRATION RATE: 18 BRPM | HEIGHT: 67 IN | WEIGHT: 250 LBS | BODY MASS INDEX: 39.24 KG/M2 | OXYGEN SATURATION: 98 % | SYSTOLIC BLOOD PRESSURE: 148 MMHG

## 2024-04-07 DIAGNOSIS — F41.9 ANXIETY: Primary | ICD-10-CM

## 2024-04-07 DIAGNOSIS — F43.21 GRIEF REACTION: ICD-10-CM

## 2024-04-07 LAB
ALBUMIN SERPL-MCNC: 4 G/DL (ref 3.5–5.2)
ALBUMIN/GLOB SERPL: 1.6 G/DL
ALP SERPL-CCNC: 74 U/L (ref 39–117)
ALT SERPL W P-5'-P-CCNC: 31 U/L (ref 1–33)
ANION GAP SERPL CALCULATED.3IONS-SCNC: 15.4 MMOL/L (ref 5–15)
AST SERPL-CCNC: 28 U/L (ref 1–32)
BASOPHILS # BLD AUTO: 0.02 10*3/MM3 (ref 0–0.2)
BASOPHILS NFR BLD AUTO: 0.3 % (ref 0–1.5)
BILIRUB SERPL-MCNC: 2.1 MG/DL (ref 0–1.2)
BUN SERPL-MCNC: 10 MG/DL (ref 6–20)
BUN/CREAT SERPL: 10 (ref 7–25)
CALCIUM SPEC-SCNC: 8.9 MG/DL (ref 8.6–10.5)
CHLORIDE SERPL-SCNC: 100 MMOL/L (ref 98–107)
CO2 SERPL-SCNC: 21.6 MMOL/L (ref 22–29)
CREAT SERPL-MCNC: 1 MG/DL (ref 0.57–1)
D DIMER PPP FEU-MCNC: 0.31 MCGFEU/ML (ref 0–0.5)
DEPRECATED RDW RBC AUTO: 54.6 FL (ref 37–54)
EGFRCR SERPLBLD CKD-EPI 2021: 74.6 ML/MIN/1.73
EOSINOPHIL # BLD AUTO: 0.01 10*3/MM3 (ref 0–0.4)
EOSINOPHIL NFR BLD AUTO: 0.1 % (ref 0.3–6.2)
ERYTHROCYTE [DISTWIDTH] IN BLOOD BY AUTOMATED COUNT: 14.7 % (ref 12.3–15.4)
GLOBULIN UR ELPH-MCNC: 2.5 GM/DL
GLUCOSE SERPL-MCNC: 115 MG/DL (ref 65–99)
HCG SERPL QL: NEGATIVE
HCT VFR BLD AUTO: 37.3 % (ref 34–46.6)
HGB BLD-MCNC: 12.6 G/DL (ref 12–15.9)
IMM GRANULOCYTES # BLD AUTO: 0.02 10*3/MM3 (ref 0–0.05)
IMM GRANULOCYTES NFR BLD AUTO: 0.3 % (ref 0–0.5)
LYMPHOCYTES # BLD AUTO: 1.7 10*3/MM3 (ref 0.7–3.1)
LYMPHOCYTES NFR BLD AUTO: 22.1 % (ref 19.6–45.3)
MCH RBC QN AUTO: 33.8 PG (ref 26.6–33)
MCHC RBC AUTO-ENTMCNC: 33.8 G/DL (ref 31.5–35.7)
MCV RBC AUTO: 100 FL (ref 79–97)
MONOCYTES # BLD AUTO: 0.43 10*3/MM3 (ref 0.1–0.9)
MONOCYTES NFR BLD AUTO: 5.6 % (ref 5–12)
NEUTROPHILS NFR BLD AUTO: 5.5 10*3/MM3 (ref 1.7–7)
NEUTROPHILS NFR BLD AUTO: 71.6 % (ref 42.7–76)
NRBC BLD AUTO-RTO: 0 /100 WBC (ref 0–0.2)
NT-PROBNP SERPL-MCNC: 88.9 PG/ML (ref 0–450)
PLATELET # BLD AUTO: 221 10*3/MM3 (ref 140–450)
PMV BLD AUTO: 10 FL (ref 6–12)
POTASSIUM SERPL-SCNC: 3.1 MMOL/L (ref 3.5–5.2)
PROT SERPL-MCNC: 6.5 G/DL (ref 6–8.5)
RBC # BLD AUTO: 3.73 10*6/MM3 (ref 3.77–5.28)
SODIUM SERPL-SCNC: 137 MMOL/L (ref 136–145)
TROPONIN T SERPL HS-MCNC: <6 NG/L
WBC NRBC COR # BLD AUTO: 7.68 10*3/MM3 (ref 3.4–10.8)

## 2024-04-07 PROCEDURE — 84703 CHORIONIC GONADOTROPIN ASSAY: CPT | Performed by: EMERGENCY MEDICINE

## 2024-04-07 PROCEDURE — 85025 COMPLETE CBC W/AUTO DIFF WBC: CPT | Performed by: EMERGENCY MEDICINE

## 2024-04-07 PROCEDURE — 71045 X-RAY EXAM CHEST 1 VIEW: CPT

## 2024-04-07 PROCEDURE — 85379 FIBRIN DEGRADATION QUANT: CPT | Performed by: EMERGENCY MEDICINE

## 2024-04-07 PROCEDURE — 93010 ELECTROCARDIOGRAM REPORT: CPT | Performed by: INTERNAL MEDICINE

## 2024-04-07 PROCEDURE — 83880 ASSAY OF NATRIURETIC PEPTIDE: CPT | Performed by: EMERGENCY MEDICINE

## 2024-04-07 PROCEDURE — 80053 COMPREHEN METABOLIC PANEL: CPT | Performed by: EMERGENCY MEDICINE

## 2024-04-07 PROCEDURE — 36415 COLL VENOUS BLD VENIPUNCTURE: CPT

## 2024-04-07 PROCEDURE — 63710000001 ONDANSETRON PER 8 MG

## 2024-04-07 PROCEDURE — 99284 EMERGENCY DEPT VISIT MOD MDM: CPT

## 2024-04-07 PROCEDURE — 93005 ELECTROCARDIOGRAM TRACING: CPT | Performed by: EMERGENCY MEDICINE

## 2024-04-07 PROCEDURE — 84484 ASSAY OF TROPONIN QUANT: CPT | Performed by: EMERGENCY MEDICINE

## 2024-04-07 RX ORDER — LORAZEPAM 0.5 MG/1
0.5 TABLET ORAL EVERY 8 HOURS PRN
Qty: 30 TABLET | Refills: 0 | Status: SHIPPED | OUTPATIENT
Start: 2024-04-07 | End: 2024-04-07

## 2024-04-07 RX ORDER — ONDANSETRON 4 MG/1
4 TABLET, ORALLY DISINTEGRATING ORAL ONCE
Status: DISCONTINUED | OUTPATIENT
Start: 2024-04-07 | End: 2024-04-07 | Stop reason: HOSPADM

## 2024-04-07 RX ORDER — ONDANSETRON HYDROCHLORIDE 8 MG/1
8 TABLET, FILM COATED ORAL EVERY 8 HOURS PRN
Qty: 10 TABLET | Refills: 0 | Status: SHIPPED | OUTPATIENT
Start: 2024-04-07 | End: 2024-04-07

## 2024-04-07 RX ORDER — LORAZEPAM 0.5 MG/1
0.5 TABLET ORAL EVERY 8 HOURS PRN
Qty: 30 TABLET | Refills: 0 | Status: SHIPPED | OUTPATIENT
Start: 2024-04-07

## 2024-04-07 RX ORDER — ONDANSETRON 4 MG/1
TABLET, FILM COATED ORAL
Status: COMPLETED
Start: 2024-04-07 | End: 2024-04-07

## 2024-04-07 RX ORDER — LORAZEPAM 1 MG/1
1 TABLET ORAL EVERY 6 HOURS PRN
Status: DISCONTINUED | OUTPATIENT
Start: 2024-04-07 | End: 2024-04-07 | Stop reason: HOSPADM

## 2024-04-07 RX ORDER — ONDANSETRON HYDROCHLORIDE 8 MG/1
8 TABLET, FILM COATED ORAL EVERY 8 HOURS PRN
Qty: 10 TABLET | Refills: 0 | Status: SHIPPED | OUTPATIENT
Start: 2024-04-07

## 2024-04-07 RX ADMIN — LORAZEPAM 1 MG: 1 TABLET ORAL at 05:46

## 2024-04-07 RX ADMIN — ONDANSETRON: 4 TABLET, FILM COATED ORAL at 06:42

## 2024-04-07 NOTE — DISCHARGE INSTRUCTIONS
Take the Ativan as needed as prescribed.  Call the patient connection line on Monday for a primary care provider to follow-up with next week.  Return to the emergency department if there is worsening chest pain, shortness of breath, worse in any way at all.

## 2024-04-07 NOTE — ED PROVIDER NOTES
Subjective   History of Present Illness    Chief complaint: Shaking, chest pain, and shortness of breath    Location: Home    Quality/Severity: Moderate    Timing/Onset/Duration: Just prior to arrival            Chief complaint: Patient   feeling anxious with chest pain and shortness of breath    Location: Anterior    Quality/Severity: Moderate    Timing/Onset/Duration: Just prior to arrival.    Modifying Factors: Triggered by death of her grandfather    Associated Symptoms: No headache.  No fever chills or cough.  No sore throat earache or nasal congestion.  No abdominal pain.  No diarrhea or burning when she urinates.  Patient's had nausea but no vomiting.    Narrative: This 38-year-old presents with chest pain, shortness of breath, and feeling anxious after learning her grandfather  just prior to arrival.  The patient denies ingestion of alcohol or illicit drugs.  She is taking only her prescribed medications and the prescribed amount.  She denies suicidal or homicidal ideation.  No hallucinations.        Review of Systems   Constitutional:  Negative for chills and fever.   HENT:  Negative for congestion, ear pain and sore throat.    Respiratory:  Positive for shortness of breath. Negative for cough.    Cardiovascular:  Positive for chest pain.   Gastrointestinal:  Negative for abdominal pain, diarrhea, nausea and vomiting.   Genitourinary:  Negative for difficulty urinating.   Musculoskeletal:  Negative for back pain.   Skin:  Negative for rash.   Neurological:  Negative for headaches.         Past Medical History:   Diagnosis Date    ADHD (attention deficit hyperactivity disorder)     Anxiety     Arthritis     Degeneration of lumbar intervertebral disc     Hypothyroidism     Low back pain     Migraine headache     Nausea and vomiting 2023       Allergies   Allergen Reactions    Latex, Natural Rubber Itching     Local redness    local rash   Bandaid related       Past Surgical History:   Procedure  Laterality Date    COLONOSCOPY N/A 7/24/2023    Procedure: COLONOSCOPY WITH BIOPSY;  Surgeon: Luis Enrique Wade MD;  Location:  [facility] OR;  Service: Gastroenterology;  Laterality: N/A;  Biopsies- right and left colon; External hemorrhoids    ENDOSCOPY N/A 7/24/2023    Procedure: ESOPHAGOGASTRODUODENOSCOPY WITH BIOPSY;  Surgeon: Luis Enrique Wade MD;  Location:  [facility] OR;  Service: Gastroenterology;  Laterality: N/A;  Duodenitis; Gastritis; Biopsies- gastric    WISDOM TOOTH EXTRACTION         Family History   Problem Relation Age of Onset    Fibromyalgia Mother     Hypertension Mother     Lupus Mother     Colon polyps Mother     Hypertension Father     Colon polyps Father     Breast cancer Maternal Grandmother     Endometriosis Maternal Grandmother     Breast cancer Paternal Grandmother     Colon cancer Neg Hx        Social History     Socioeconomic History    Marital status:      Spouse name: Rakesh    Number of children: 2   Tobacco Use    Smoking status: Every Day     Current packs/day: 0.25     Average packs/day: 0.3 packs/day for 15.0 years (3.8 ttl pk-yrs)     Types: Cigarettes     Passive exposure: Current (>15 yrs ago)    Smokeless tobacco: Never    Tobacco comments:     N/A   Vaping Use    Vaping status: Never Used   Substance and Sexual Activity    Alcohol use: Yes     Alcohol/week: 6.0 standard drinks of alcohol     Types: 6 Glasses of wine per week     Comment: hard lemonade    Drug use: No    Sexual activity: Defer           Objective   Physical Exam  Vitals (The temperature is 97.8 °F, pulse 92, respirations 20, /129, room air pulse ox 100%.) and nursing note reviewed.   Constitutional:       Comments: Anxious appearing   HENT:      Head: Normocephalic and atraumatic.      Right Ear: Tympanic membrane normal.      Left Ear: Tympanic membrane normal.      Nose: Nose normal.   Cardiovascular:      Rate and Rhythm: Normal rate and regular rhythm.      Pulses: Normal pulses.      Heart sounds:  Normal heart sounds. No murmur heard.     No friction rub. No gallop.   Pulmonary:      Effort: Pulmonary effort is normal.      Breath sounds: Normal breath sounds.   Abdominal:      General: Abdomen is flat. Bowel sounds are normal. There is no distension.      Palpations: Abdomen is soft. There is no mass.      Tenderness: There is no abdominal tenderness. There is no guarding or rebound.      Hernia: No hernia is present.   Musculoskeletal:         General: No swelling or tenderness. Normal range of motion.      Cervical back: Normal range of motion and neck supple.   Skin:     General: Skin is warm and dry.      Findings: No rash.   Neurological:      General: No focal deficit present.      Mental Status: She is alert and oriented to person, place, and time.         Procedures           ED Course  ED Course as of 04/07/24 0715   Sun Apr 07, 2024   0634 The white blood cell count is 7.6, hemoglobin 12.6, hematocrit 37, platelet count 21,000, CBC is otherwise unremarkable. [RC]   0648 The D-dimer is 0.31 and normal [RC]   0654 The glucose is 115, the potassium is 3.1, CO2 is 21, the GFR 74, the CMP is otherwise unremarkable [RC]   0655 The high-sensitivity troponin is less than 6 [RC]   0701 The serum hCG is negative. [RC]   0713 The proBNP is 88 normal [RC]      ED Course User Index  [RC] Joaquín Roberto MD      06:39 EDT, 04/07/24:  The EKG was obtained at 0 637 read by me at 0 638.  The EKG shows a normal sinus rhythm with rate of 77.  There is a normal axis with no hypertrophy.  The AZ, QRS, and QT intervals are unremarkable.  There is no ectopy.  There is no acute ST elevation or depression.  There is minor artifact in multiple leads.    06:50 EDT, 04/07/24:  The patient was reassessed.  She feels better.  Her vital signs were reviewed and are stable.                           06:49 EDT, 04/07/24:  The patient's diagnosis of anxiety and acute grief reaction was discussed with her.  The patient will be  given a prescription for Ativan.  She should call the patient connection line Monday morning for a primary care provider to follow-up with next week.  He should return to emergency department if she has worsening chest pain, shortness of breath, worsens in any way at all.  All the patient questions were answered she will be discharged in good condition  07:16 EDT, 04/07/24:  Blood pressure at time of discharge is 148/84.          Medical Decision Making      Final diagnoses:   Anxiety   Grief reaction       ED Disposition  ED Disposition       None            No follow-up provider specified.       Medication List      No changes were made to your prescriptions during this visit.            Joaquín Roberto MD  04/07/24 0715       Joaquín Roberto MD  04/07/24 0716

## 2024-04-08 LAB
QT INTERVAL: 416 MS
QTC INTERVAL: 472 MS

## 2024-04-25 ENCOUNTER — TRANSCRIBE ORDERS (OUTPATIENT)
Dept: CT IMAGING | Facility: HOSPITAL | Age: 39
End: 2024-04-25
Payer: COMMERCIAL

## 2024-04-25 ENCOUNTER — TRANSCRIBE ORDERS (OUTPATIENT)
Dept: PULMONOLOGY | Facility: HOSPITAL | Age: 39
End: 2024-04-25
Payer: COMMERCIAL

## 2024-04-25 DIAGNOSIS — G47.33 OSA (OBSTRUCTIVE SLEEP APNEA): Primary | ICD-10-CM

## 2024-04-25 DIAGNOSIS — R91.1 LUNG NODULE: Primary | ICD-10-CM

## 2024-04-29 ENCOUNTER — TRANSCRIBE ORDERS (OUTPATIENT)
Dept: ADMINISTRATIVE | Facility: HOSPITAL | Age: 39
End: 2024-04-29
Payer: COMMERCIAL

## 2024-04-29 DIAGNOSIS — R06.00 DYSPNEA, UNSPECIFIED TYPE: Primary | ICD-10-CM

## 2024-05-01 ENCOUNTER — HOSPITAL ENCOUNTER (OUTPATIENT)
Dept: SLEEP MEDICINE | Facility: HOSPITAL | Age: 39
End: 2024-05-01
Payer: COMMERCIAL

## 2024-05-01 DIAGNOSIS — G47.33 OSA (OBSTRUCTIVE SLEEP APNEA): ICD-10-CM

## 2024-05-01 PROCEDURE — 95806 SLEEP STUDY UNATT&RESP EFFT: CPT

## 2024-05-02 ENCOUNTER — APPOINTMENT (OUTPATIENT)
Dept: ULTRASOUND IMAGING | Facility: HOSPITAL | Age: 39
End: 2024-05-02
Payer: COMMERCIAL

## 2024-05-02 ENCOUNTER — APPOINTMENT (OUTPATIENT)
Dept: CT IMAGING | Facility: HOSPITAL | Age: 39
End: 2024-05-02
Payer: COMMERCIAL

## 2024-05-02 ENCOUNTER — APPOINTMENT (OUTPATIENT)
Dept: GENERAL RADIOLOGY | Facility: HOSPITAL | Age: 39
End: 2024-05-02
Payer: COMMERCIAL

## 2024-05-02 ENCOUNTER — HOSPITAL ENCOUNTER (EMERGENCY)
Facility: HOSPITAL | Age: 39
Discharge: HOME | End: 2024-05-02
Attending: EMERGENCY MEDICINE
Payer: COMMERCIAL

## 2024-05-02 VITALS
TEMPERATURE: 97.8 F | OXYGEN SATURATION: 99 % | WEIGHT: 250 LBS | HEIGHT: 66 IN | RESPIRATION RATE: 18 BRPM | BODY MASS INDEX: 40.18 KG/M2 | HEART RATE: 89 BPM | SYSTOLIC BLOOD PRESSURE: 147 MMHG | DIASTOLIC BLOOD PRESSURE: 84 MMHG

## 2024-05-02 DIAGNOSIS — R10.13 EPIGASTRIC PAIN: Primary | ICD-10-CM

## 2024-05-02 LAB
ALBUMIN SERPL-MCNC: 3.9 G/DL (ref 3.5–5.2)
ALBUMIN/GLOB SERPL: 1.5 G/DL
ALP SERPL-CCNC: 78 U/L (ref 39–117)
ALT SERPL W P-5'-P-CCNC: 34 U/L (ref 1–33)
AMPHET+METHAMPHET UR QL: NEGATIVE
AMPHETAMINES UR QL: NEGATIVE
ANION GAP SERPL CALCULATED.3IONS-SCNC: 17 MMOL/L (ref 5–15)
AST SERPL-CCNC: 33 U/L (ref 1–32)
BARBITURATES UR QL SCN: NEGATIVE
BASOPHILS # BLD AUTO: 0.04 10*3/MM3 (ref 0–0.2)
BASOPHILS NFR BLD AUTO: 0.6 % (ref 0–1.5)
BENZODIAZ UR QL SCN: POSITIVE
BILIRUB SERPL-MCNC: 0.8 MG/DL (ref 0–1.2)
BILIRUB UR QL STRIP: NEGATIVE
BUN SERPL-MCNC: 12 MG/DL (ref 6–20)
BUN/CREAT SERPL: 13.2 (ref 7–25)
BUPRENORPHINE SERPL-MCNC: NEGATIVE NG/ML
CALCIUM SPEC-SCNC: 8.8 MG/DL (ref 8.6–10.5)
CANNABINOIDS SERPL QL: NEGATIVE
CHLORIDE SERPL-SCNC: 99 MMOL/L (ref 98–107)
CLARITY UR: CLEAR
CO2 SERPL-SCNC: 20 MMOL/L (ref 22–29)
COCAINE UR QL: NEGATIVE
COLOR UR: ABNORMAL
CREAT SERPL-MCNC: 0.91 MG/DL (ref 0.57–1)
D DIMER PPP FEU-MCNC: 0.37 MCGFEU/ML (ref 0–0.5)
DEPRECATED RDW RBC AUTO: 53.2 FL (ref 37–54)
EGFRCR SERPLBLD CKD-EPI 2021: 83.5 ML/MIN/1.73
EOSINOPHIL # BLD AUTO: 0.02 10*3/MM3 (ref 0–0.4)
EOSINOPHIL NFR BLD AUTO: 0.3 % (ref 0.3–6.2)
ERYTHROCYTE [DISTWIDTH] IN BLOOD BY AUTOMATED COUNT: 14.2 % (ref 12.3–15.4)
GLOBULIN UR ELPH-MCNC: 2.6 GM/DL
GLUCOSE SERPL-MCNC: 90 MG/DL (ref 65–99)
GLUCOSE UR STRIP-MCNC: NEGATIVE MG/DL
HCG SERPL QL: NEGATIVE
HCT VFR BLD AUTO: 38.4 % (ref 34–46.6)
HGB BLD-MCNC: 13 G/DL (ref 12–15.9)
HGB UR QL STRIP.AUTO: NEGATIVE
IMM GRANULOCYTES # BLD AUTO: 0.03 10*3/MM3 (ref 0–0.05)
IMM GRANULOCYTES NFR BLD AUTO: 0.4 % (ref 0–0.5)
KETONES UR QL STRIP: ABNORMAL
LEUKOCYTE ESTERASE UR QL STRIP.AUTO: NEGATIVE
LIPASE SERPL-CCNC: 36 U/L (ref 13–60)
LYMPHOCYTES # BLD AUTO: 2.13 10*3/MM3 (ref 0.7–3.1)
LYMPHOCYTES NFR BLD AUTO: 30.8 % (ref 19.6–45.3)
MCH RBC QN AUTO: 34.6 PG (ref 26.6–33)
MCHC RBC AUTO-ENTMCNC: 33.9 G/DL (ref 31.5–35.7)
MCV RBC AUTO: 102.1 FL (ref 79–97)
METHADONE UR QL SCN: NEGATIVE
MONOCYTES # BLD AUTO: 0.36 10*3/MM3 (ref 0.1–0.9)
MONOCYTES NFR BLD AUTO: 5.2 % (ref 5–12)
NEUTROPHILS NFR BLD AUTO: 4.33 10*3/MM3 (ref 1.7–7)
NEUTROPHILS NFR BLD AUTO: 62.7 % (ref 42.7–76)
NITRITE UR QL STRIP: NEGATIVE
NRBC BLD AUTO-RTO: 0 /100 WBC (ref 0–0.2)
OPIATES UR QL: NEGATIVE
OXYCODONE UR QL SCN: NEGATIVE
PCP UR QL SCN: NEGATIVE
PH UR STRIP.AUTO: 6 [PH] (ref 4.5–8)
PLATELET # BLD AUTO: 221 10*3/MM3 (ref 140–450)
PMV BLD AUTO: 10 FL (ref 6–12)
POTASSIUM SERPL-SCNC: 3.8 MMOL/L (ref 3.5–5.2)
PROT SERPL-MCNC: 6.5 G/DL (ref 6–8.5)
PROT UR QL STRIP: NEGATIVE
RBC # BLD AUTO: 3.76 10*6/MM3 (ref 3.77–5.28)
SODIUM SERPL-SCNC: 136 MMOL/L (ref 136–145)
SP GR UR STRIP: 1.02 (ref 1–1.03)
TRICYCLICS UR QL SCN: NEGATIVE
TROPONIN T SERPL HS-MCNC: <6 NG/L
UROBILINOGEN UR QL STRIP: ABNORMAL
WBC NRBC COR # BLD AUTO: 6.91 10*3/MM3 (ref 3.4–10.8)

## 2024-05-02 PROCEDURE — 80306 DRUG TEST PRSMV INSTRMNT: CPT | Performed by: EMERGENCY MEDICINE

## 2024-05-02 PROCEDURE — 99285 EMERGENCY DEPT VISIT HI MDM: CPT

## 2024-05-02 PROCEDURE — 93005 ELECTROCARDIOGRAM TRACING: CPT | Performed by: EMERGENCY MEDICINE

## 2024-05-02 PROCEDURE — 96375 TX/PRO/DX INJ NEW DRUG ADDON: CPT

## 2024-05-02 PROCEDURE — 25010000002 ONDANSETRON PER 1 MG: Performed by: EMERGENCY MEDICINE

## 2024-05-02 PROCEDURE — 80053 COMPREHEN METABOLIC PANEL: CPT | Performed by: EMERGENCY MEDICINE

## 2024-05-02 PROCEDURE — 76705 ECHO EXAM OF ABDOMEN: CPT

## 2024-05-02 PROCEDURE — 96374 THER/PROPH/DIAG INJ IV PUSH: CPT

## 2024-05-02 PROCEDURE — 81003 URINALYSIS AUTO W/O SCOPE: CPT | Performed by: EMERGENCY MEDICINE

## 2024-05-02 PROCEDURE — 71045 X-RAY EXAM CHEST 1 VIEW: CPT

## 2024-05-02 PROCEDURE — 85379 FIBRIN DEGRADATION QUANT: CPT | Performed by: EMERGENCY MEDICINE

## 2024-05-02 PROCEDURE — 25810000003 SODIUM CHLORIDE 0.9 % SOLUTION: Performed by: EMERGENCY MEDICINE

## 2024-05-02 PROCEDURE — 84703 CHORIONIC GONADOTROPIN ASSAY: CPT | Performed by: EMERGENCY MEDICINE

## 2024-05-02 PROCEDURE — 85025 COMPLETE CBC W/AUTO DIFF WBC: CPT | Performed by: EMERGENCY MEDICINE

## 2024-05-02 PROCEDURE — 84484 ASSAY OF TROPONIN QUANT: CPT | Performed by: EMERGENCY MEDICINE

## 2024-05-02 PROCEDURE — 96361 HYDRATE IV INFUSION ADD-ON: CPT

## 2024-05-02 PROCEDURE — 25010000002 MIDAZOLAM PER 1MG: Performed by: EMERGENCY MEDICINE

## 2024-05-02 PROCEDURE — 74177 CT ABD & PELVIS W/CONTRAST: CPT

## 2024-05-02 PROCEDURE — 25510000001 IOPAMIDOL PER 1 ML: Performed by: EMERGENCY MEDICINE

## 2024-05-02 PROCEDURE — 83690 ASSAY OF LIPASE: CPT | Performed by: EMERGENCY MEDICINE

## 2024-05-02 RX ORDER — ONDANSETRON 2 MG/ML
4 INJECTION INTRAMUSCULAR; INTRAVENOUS ONCE
Status: COMPLETED | OUTPATIENT
Start: 2024-05-02 | End: 2024-05-02

## 2024-05-02 RX ORDER — SUCRALFATE ORAL 1 G/10ML
1 SUSPENSION ORAL 4 TIMES DAILY
Qty: 420 ML | Refills: 0 | Status: SHIPPED | OUTPATIENT
Start: 2024-05-02 | End: 2024-05-09

## 2024-05-02 RX ORDER — SUCRALFATE 1 G/1
1 TABLET ORAL ONCE
Status: COMPLETED | OUTPATIENT
Start: 2024-05-02 | End: 2024-05-02

## 2024-05-02 RX ORDER — MIDAZOLAM HYDROCHLORIDE 1 MG/ML
2 INJECTION INTRAMUSCULAR; INTRAVENOUS ONCE
Status: DISCONTINUED | OUTPATIENT
Start: 2024-05-02 | End: 2024-05-02

## 2024-05-02 RX ORDER — MIDAZOLAM HYDROCHLORIDE 2 MG/2ML
2 INJECTION, SOLUTION INTRAMUSCULAR; INTRAVENOUS ONCE
Status: COMPLETED | OUTPATIENT
Start: 2024-05-02 | End: 2024-05-02

## 2024-05-02 RX ADMIN — SODIUM CHLORIDE 1000 ML: 9 INJECTION, SOLUTION INTRAVENOUS at 15:57

## 2024-05-02 RX ADMIN — SUCRALFATE 1 G: 1 TABLET ORAL at 17:52

## 2024-05-02 RX ADMIN — MIDAZOLAM HYDROCHLORIDE 2 MG: 1 INJECTION, SOLUTION INTRAMUSCULAR; INTRAVENOUS at 15:56

## 2024-05-02 RX ADMIN — IOPAMIDOL 100 ML: 755 INJECTION, SOLUTION INTRAVENOUS at 17:02

## 2024-05-02 RX ADMIN — ONDANSETRON 4 MG: 2 INJECTION INTRAMUSCULAR; INTRAVENOUS at 15:57

## 2024-05-02 NOTE — DISCHARGE INSTRUCTIONS
Avoid spicy and fatty foods.  The Carafate and Protonix as prescribed.  The patient connection line in the morning for an appointment with a GI specialist within 1 week.  Continue your follow-up with sleep medicine and your primary care provider.  Return to the emergency department if you are worse in any way at all.

## 2024-05-02 NOTE — ED NOTES
The patient stated that she is unable to provide urine at this time due to possible dehydration. She also stated that she only has two Ativan remaining from her previous visit. The patient also stated that she has not followed up with PCP from previous visit.

## 2024-05-02 NOTE — ED PROVIDER NOTES
"Subjective   History of Present Illness    Chief complaint: Abdominal pain    Location: Epigastric, radiating to the back    Quality/Severity: 10/10 at its worst, 6 out of 10 currently, sharp    Timing/Onset/Duration: Started this morning at 930 after drinking a big red.    Modifying Factors: Worse with food and drink    Associated Symptoms: Mild headache.  No fever.  Patient said chills  No cough sore throat earache or nasal congestion.  Patient complains of some chest pain and shortness of breath, she thinks this is anxiety.  No burning on urination.  No vaginal bleeding or discharges.  The patient has had nonbloody diarrhea.      Narrative: This 38-year-old presents with abdominal pain that started today and radiates to the back and the chest.  The patient has not been around anyone who has been sick.  She did not eat breakfast this morning.  The patient has not had a history of C. difficile, foreign travel or ingestion of well water.  Patient has been told in the past that she has a \"bad gallbladder\" but has never gotten it out.  The patient states that she is almost out of her Ativan.  She has been trying to get a follow-up appointment with the PCP but has not been able to get one yet.  She states she had a scope last year that was unremarkable.    PCP: No PCP.    Review of Systems   Constitutional:  Positive for chills. Negative for fever.   HENT:  Negative for congestion, ear pain and sore throat.    Respiratory:  Positive for shortness of breath (Patient relates to her anxiety.). Negative for cough.    Cardiovascular:  Positive for chest pain (Patient relates to her anxiety.).   Gastrointestinal:  Positive for abdominal pain, diarrhea, nausea and vomiting.   Genitourinary:  Negative for flank pain.   Musculoskeletal:  Negative for back pain.   Skin:  Negative for rash.   Neurological:  Positive for headaches (Mild).         Past Medical History:   Diagnosis Date    ADHD (attention deficit hyperactivity " disorder)     Anxiety     Arthritis     Degeneration of lumbar intervertebral disc 2003    Hypothyroidism     Low back pain     Migraine headache     Nausea and vomiting 07/19/2023       Allergies   Allergen Reactions    Latex, Natural Rubber Itching     Local redness    local rash   Bandaid related       Past Surgical History:   Procedure Laterality Date    COLONOSCOPY N/A 7/24/2023    Procedure: COLONOSCOPY WITH BIOPSY;  Surgeon: Luis Enrique Wade MD;  Location: [facility] OR;  Service: Gastroenterology;  Laterality: N/A;  Biopsies- right and left colon; External hemorrhoids    ENDOSCOPY N/A 7/24/2023    Procedure: ESOPHAGOGASTRODUODENOSCOPY WITH BIOPSY;  Surgeon: Luis Enrique Wade MD;  Location:  [facility] OR;  Service: Gastroenterology;  Laterality: N/A;  Duodenitis; Gastritis; Biopsies- gastric    WISDOM TOOTH EXTRACTION         Family History   Problem Relation Age of Onset    Fibromyalgia Mother     Hypertension Mother     Lupus Mother     Colon polyps Mother     Hypertension Father     Colon polyps Father     Breast cancer Maternal Grandmother     Endometriosis Maternal Grandmother     Breast cancer Paternal Grandmother     Colon cancer Neg Hx        Social History     Socioeconomic History    Marital status:      Spouse name: Rakesh    Number of children: 2   Tobacco Use    Smoking status: Every Day     Current packs/day: 0.25     Average packs/day: 0.3 packs/day for 15.0 years (3.8 ttl pk-yrs)     Types: Cigarettes     Passive exposure: Current (>15 yrs ago)    Smokeless tobacco: Never    Tobacco comments:     N/A   Vaping Use    Vaping status: Never Used   Substance and Sexual Activity    Alcohol use: Yes     Alcohol/week: 6.0 standard drinks of alcohol     Types: 6 Glasses of wine per week     Comment: hard lemonade    Drug use: No    Sexual activity: Defer           Objective   Physical Exam  Vitals (The pulse is 99, respirations 22, /110, room air pulse ox 99%.  The temperature is 97.8 °F.) and  nursing note reviewed.   Constitutional:       Comments: Patient looks like she does not feel well   HENT:      Head: Normocephalic and atraumatic.      Right Ear: Tympanic membrane normal.      Left Ear: Tympanic membrane normal.   Cardiovascular:      Rate and Rhythm: Normal rate and regular rhythm.      Pulses: Normal pulses.      Heart sounds: Normal heart sounds. No murmur heard.     No friction rub. No gallop.   Pulmonary:      Effort: Pulmonary effort is normal. No respiratory distress.      Breath sounds: Normal breath sounds. No stridor. No wheezing, rhonchi or rales.   Chest:      Chest wall: No tenderness.   Abdominal:      General: Abdomen is flat. Bowel sounds are normal. There is no distension.      Palpations: Abdomen is soft. There is no mass.      Tenderness: There is abdominal tenderness (Ordered epigastric tenderness). There is no right CVA tenderness, left CVA tenderness, guarding or rebound.      Hernia: No hernia is present.   Musculoskeletal:         General: No swelling or tenderness. Normal range of motion.      Cervical back: Normal range of motion and neck supple.   Skin:     General: Skin is warm and dry.   Neurological:      General: No focal deficit present.      Mental Status: She is alert and oriented to person, place, and time.         Procedures           ED Course  ED Course as of 05/02/24 1742   Thu May 02, 2024   1605 The CBC is unremarkable [RC]   1606 The D-dimer is 0.37 and normal [RC]   1622 The high-sensitivity troponin is less than 6 [RC]   1622 CO2 was 20, in the way of 34, AST 33, anion gap 17, GFR 83, CMP is otherwise unremarkable. [RC]   1645 The serum hCG is negative. [RC]   1742 Urine drug screen is positive for benzodiazepines. [RC]   1742 Urinalysis shows trace ketones, otherwise unremarkable. [RC]      ED Course User Index  [RC] Joaquín Roberto MD      15:36 EDT, 05/02/24:  The EKG was obtained at 1533 and read by me at 1535.  EKG shows a normal sinus rhythm  with rate 86.  There is a normal axis with no hypertrophy.  The HI, QRS, QT intervals are unremarkable.  There is no ectopy.  There is no acute ST elevation or depression.                           16:08 EDT, 05/02/24:  Repeat blood pressure is 136/79, it has improved.      17:42 EDT, 05/02/24:  The patient was reassessed.  She feels better.  Her vital signs were reviewed and are stable.  Patient is still complaining of epigastric pain.        18:47 EDT, 05/02/24:  The patient was reassessed.  He has no new complaints.  Her vital signs reviewed and are stable.  The patient states that she is in the process of getting evaluated for sleep apnea and she feels like part of her anxiety is over fear of not breathing when she goes to sleep.  Patient has been reassured to continue forward with getting her sleep study done and being evaluated by sleep medicine.  She should follow-up with Dr. Dee.  Will give her referral to a GI doctor as well for possible endoscopy if she does not get better on the Protonix and Carafate.  The patient should return to the emergency department if she is worse anyway at all.  All patient questions were answered she will be discharged in good condition.  Medical Decision Making      Final diagnoses:   Epigastric pain       ED Disposition  ED Disposition       None            No follow-up provider specified.       Medication List      No changes were made to your prescriptions during this visit.            Joaquín Roberto MD  05/02/24 3879

## 2024-05-03 ENCOUNTER — TELEPHONE (OUTPATIENT)
Dept: GASTROENTEROLOGY | Facility: CLINIC | Age: 39
End: 2024-05-03
Payer: COMMERCIAL

## 2024-05-03 DIAGNOSIS — R11.2 NAUSEA AND VOMITING, UNSPECIFIED VOMITING TYPE: Primary | ICD-10-CM

## 2024-05-03 DIAGNOSIS — R10.13 DYSPEPSIA: ICD-10-CM

## 2024-05-03 LAB
QT INTERVAL: 365 MS
QTC INTERVAL: 438 MS

## 2024-05-03 RX ORDER — ONDANSETRON 8 MG/1
8 TABLET, ORALLY DISINTEGRATING ORAL EVERY 8 HOURS PRN
Qty: 90 TABLET | Refills: 11 | Status: SHIPPED | OUTPATIENT
Start: 2024-05-03

## 2024-05-03 RX ORDER — OMEPRAZOLE 40 MG/1
40 CAPSULE, DELAYED RELEASE ORAL
Qty: 180 CAPSULE | Refills: 3 | Status: SHIPPED | OUTPATIENT
Start: 2024-05-03

## 2024-05-03 NOTE — TELEPHONE ENCOUNTER
She missed her follow up in 10/2024 but will prescribe as needed Zofran and change her to Omeprazole to see if that works better in the meantime before her appt on 5/9.

## 2024-05-03 NOTE — TELEPHONE ENCOUNTER
Call from patient to schedule ER FU.  Scheduled 5-9-24     She is still having symptoms and is unable to eat.  Tender stomach, mucous diarrhea, throwing up bile.

## 2024-05-06 ENCOUNTER — TELEPHONE (OUTPATIENT)
Dept: GASTROENTEROLOGY | Facility: CLINIC | Age: 39
End: 2024-05-06
Payer: COMMERCIAL

## 2024-05-06 DIAGNOSIS — R11.2 NAUSEA AND VOMITING, UNSPECIFIED VOMITING TYPE: ICD-10-CM

## 2024-05-07 RX ORDER — ONDANSETRON 8 MG/1
8 TABLET, ORALLY DISINTEGRATING ORAL EVERY 8 HOURS PRN
Qty: 24 TABLET | Refills: 11 | Status: SHIPPED | OUTPATIENT
Start: 2024-05-07

## 2024-05-09 ENCOUNTER — TELEPHONE (OUTPATIENT)
Dept: GASTROENTEROLOGY | Facility: CLINIC | Age: 39
End: 2024-05-09

## 2024-05-09 ENCOUNTER — OFFICE VISIT (OUTPATIENT)
Dept: GASTROENTEROLOGY | Facility: CLINIC | Age: 39
End: 2024-05-09
Payer: COMMERCIAL

## 2024-05-09 VITALS
WEIGHT: 251.4 LBS | SYSTOLIC BLOOD PRESSURE: 138 MMHG | DIASTOLIC BLOOD PRESSURE: 90 MMHG | BODY MASS INDEX: 40.4 KG/M2 | HEIGHT: 66 IN

## 2024-05-09 DIAGNOSIS — R10.13 DYSPEPSIA: Primary | ICD-10-CM

## 2024-05-09 DIAGNOSIS — K29.70 HELICOBACTER PYLORI GASTRITIS: ICD-10-CM

## 2024-05-09 DIAGNOSIS — B96.81 HELICOBACTER PYLORI GASTRITIS: ICD-10-CM

## 2024-05-09 DIAGNOSIS — R11.2 NAUSEA AND VOMITING, UNSPECIFIED VOMITING TYPE: ICD-10-CM

## 2024-05-09 DIAGNOSIS — K58.0 IRRITABLE BOWEL SYNDROME WITH DIARRHEA: ICD-10-CM

## 2024-05-09 DIAGNOSIS — K21.9 GASTROESOPHAGEAL REFLUX DISEASE, UNSPECIFIED WHETHER ESOPHAGITIS PRESENT: ICD-10-CM

## 2024-05-09 RX ORDER — SUCRALFATE ORAL 1 G/10ML
1 SUSPENSION ORAL 4 TIMES DAILY
Qty: 414 ML | Refills: 11 | Status: SHIPPED | OUTPATIENT
Start: 2024-05-09

## 2024-05-09 RX ORDER — SUCRALFATE ORAL 1 G/10ML
1 SUSPENSION ORAL 4 TIMES DAILY
Qty: 414 ML | Refills: 11 | Status: SHIPPED | OUTPATIENT
Start: 2024-05-09 | End: 2024-05-09

## 2024-05-09 RX ORDER — VONOPRAZAN FUMARATE 26.72 MG/1
20 TABLET ORAL DAILY
Qty: 30 TABLET | Refills: 5 | Status: SHIPPED | OUTPATIENT
Start: 2024-05-09 | End: 2024-05-09

## 2024-05-09 RX ORDER — VONOPRAZAN FUMARATE 26.72 MG/1
20 TABLET ORAL DAILY
Qty: 30 TABLET | Refills: 1 | Status: SHIPPED | OUTPATIENT
Start: 2024-05-09

## 2024-05-09 RX ORDER — PROMETHAZINE HYDROCHLORIDE 25 MG/1
25 TABLET ORAL EVERY 6 HOURS PRN
Qty: 120 TABLET | Refills: 11 | Status: SHIPPED | OUTPATIENT
Start: 2024-05-09

## 2024-05-09 RX ORDER — LOPERAMIDE HYDROCHLORIDE 2 MG/1
2 CAPSULE ORAL 4 TIMES DAILY PRN
COMMUNITY
Start: 2024-05-03

## 2024-05-09 RX ORDER — PROMETHAZINE HYDROCHLORIDE 25 MG/1
25 TABLET ORAL EVERY 6 HOURS PRN
Qty: 120 TABLET | Refills: 11 | Status: SHIPPED | OUTPATIENT
Start: 2024-05-09 | End: 2024-05-09

## 2024-05-09 RX ORDER — PANTOPRAZOLE SODIUM 40 MG/1
40 TABLET, DELAYED RELEASE ORAL 2 TIMES DAILY
COMMUNITY
Start: 2024-05-03 | End: 2024-05-09

## 2024-05-10 ENCOUNTER — TELEPHONE (OUTPATIENT)
Dept: GASTROENTEROLOGY | Facility: CLINIC | Age: 39
End: 2024-05-10
Payer: COMMERCIAL

## 2024-05-13 DIAGNOSIS — G47.33 OSA (OBSTRUCTIVE SLEEP APNEA): Primary | ICD-10-CM

## 2024-05-14 ENCOUNTER — TELEPHONE (OUTPATIENT)
Dept: SLEEP MEDICINE | Facility: HOSPITAL | Age: 39
End: 2024-05-14
Payer: COMMERCIAL

## 2024-05-14 NOTE — TELEPHONE ENCOUNTER
Spoke with pt about results and faxed order to [facility]. Pt will schedule a f/u for compliance in [place].

## 2024-05-15 ENCOUNTER — TRANSCRIBE ORDERS (OUTPATIENT)
Dept: MRI IMAGING | Facility: HOSPITAL | Age: 39
End: 2024-05-15
Payer: COMMERCIAL

## 2024-05-15 DIAGNOSIS — M54.50 LOW BACK PAIN, UNSPECIFIED BACK PAIN LATERALITY, UNSPECIFIED CHRONICITY, UNSPECIFIED WHETHER SCIATICA PRESENT: Primary | ICD-10-CM

## 2024-05-16 ENCOUNTER — ANESTHESIA EVENT (OUTPATIENT)
Dept: PERIOP | Facility: HOSPITAL | Age: 39
End: 2024-05-16
Payer: COMMERCIAL

## 2024-05-16 RX ORDER — DULOXETIN HYDROCHLORIDE 60 MG/1
60 CAPSULE, DELAYED RELEASE ORAL DAILY
COMMUNITY

## 2024-05-16 RX ORDER — PANTOPRAZOLE SODIUM 40 MG/1
40 TABLET, DELAYED RELEASE ORAL DAILY
COMMUNITY

## 2024-05-16 NOTE — PAT
Pt states that she was seen by her PCP and Valium and Cymbalta were added to her med list   She is wondering if she may take those meds and also her protonix and sucrafalate first thing in the am.  She is not scheduled to be at the hospital till 1:00 pm   LM on A Devin VM to check with Dr Wade

## 2024-05-17 ENCOUNTER — ANESTHESIA (OUTPATIENT)
Dept: PERIOP | Facility: HOSPITAL | Age: 39
End: 2024-05-17
Payer: COMMERCIAL

## 2024-05-17 ENCOUNTER — HOSPITAL ENCOUNTER (OUTPATIENT)
Facility: HOSPITAL | Age: 39
Setting detail: HOSPITAL OUTPATIENT SURGERY
Discharge: HOME | End: 2024-05-17
Attending: STUDENT IN AN ORGANIZED HEALTH CARE EDUCATION/TRAINING PROGRAM | Admitting: STUDENT IN AN ORGANIZED HEALTH CARE EDUCATION/TRAINING PROGRAM
Payer: COMMERCIAL

## 2024-05-17 VITALS
OXYGEN SATURATION: 97 % | DIASTOLIC BLOOD PRESSURE: 91 MMHG | HEART RATE: 76 BPM | TEMPERATURE: 97.7 F | WEIGHT: 246.8 LBS | BODY MASS INDEX: 39.83 KG/M2 | RESPIRATION RATE: 12 BRPM | SYSTOLIC BLOOD PRESSURE: 133 MMHG

## 2024-05-17 DIAGNOSIS — R10.13 DYSPEPSIA: ICD-10-CM

## 2024-05-17 PROCEDURE — 43239 EGD BIOPSY SINGLE/MULTIPLE: CPT | Performed by: STUDENT IN AN ORGANIZED HEALTH CARE EDUCATION/TRAINING PROGRAM

## 2024-05-17 PROCEDURE — 25810000003 LACTATED RINGERS PER 1000 ML: Performed by: NURSE ANESTHETIST, CERTIFIED REGISTERED

## 2024-05-17 PROCEDURE — 88342 IMHCHEM/IMCYTCHM 1ST ANTB: CPT | Performed by: STUDENT IN AN ORGANIZED HEALTH CARE EDUCATION/TRAINING PROGRAM

## 2024-05-17 PROCEDURE — 88305 TISSUE EXAM BY PATHOLOGIST: CPT | Performed by: STUDENT IN AN ORGANIZED HEALTH CARE EDUCATION/TRAINING PROGRAM

## 2024-05-17 PROCEDURE — 25010000002 PROPOFOL 200 MG/20ML EMULSION: Performed by: NURSE ANESTHETIST, CERTIFIED REGISTERED

## 2024-05-17 RX ORDER — ONDANSETRON 2 MG/ML
4 INJECTION INTRAMUSCULAR; INTRAVENOUS ONCE AS NEEDED
Status: DISCONTINUED | OUTPATIENT
Start: 2024-05-17 | End: 2024-05-17 | Stop reason: HOSPADM

## 2024-05-17 RX ORDER — LIDOCAINE HYDROCHLORIDE 20 MG/ML
INJECTION, SOLUTION INFILTRATION; PERINEURAL AS NEEDED
Status: DISCONTINUED | OUTPATIENT
Start: 2024-05-17 | End: 2024-05-17 | Stop reason: SURG

## 2024-05-17 RX ORDER — SODIUM CHLORIDE, SODIUM LACTATE, POTASSIUM CHLORIDE, CALCIUM CHLORIDE 600; 310; 30; 20 MG/100ML; MG/100ML; MG/100ML; MG/100ML
100 INJECTION, SOLUTION INTRAVENOUS ONCE
Status: DISCONTINUED | OUTPATIENT
Start: 2024-05-17 | End: 2024-05-17 | Stop reason: HOSPADM

## 2024-05-17 RX ORDER — PROPOFOL 10 MG/ML
INJECTION, EMULSION INTRAVENOUS AS NEEDED
Status: DISCONTINUED | OUTPATIENT
Start: 2024-05-17 | End: 2024-05-17 | Stop reason: SURG

## 2024-05-17 RX ORDER — SODIUM CHLORIDE 0.9 % (FLUSH) 0.9 %
10 SYRINGE (ML) INJECTION EVERY 12 HOURS SCHEDULED
Status: DISCONTINUED | OUTPATIENT
Start: 2024-05-17 | End: 2024-05-17 | Stop reason: HOSPADM

## 2024-05-17 RX ORDER — SODIUM CHLORIDE 0.9 % (FLUSH) 0.9 %
10 SYRINGE (ML) INJECTION AS NEEDED
Status: DISCONTINUED | OUTPATIENT
Start: 2024-05-17 | End: 2024-05-17 | Stop reason: HOSPADM

## 2024-05-17 RX ORDER — DIAZEPAM 5 MG/1
5 TABLET ORAL NIGHTLY PRN
COMMUNITY
Start: 2024-05-15

## 2024-05-17 RX ORDER — SODIUM CHLORIDE 9 MG/ML
40 INJECTION, SOLUTION INTRAVENOUS AS NEEDED
Status: DISCONTINUED | OUTPATIENT
Start: 2024-05-17 | End: 2024-05-17 | Stop reason: HOSPADM

## 2024-05-17 RX ORDER — SODIUM CHLORIDE, SODIUM LACTATE, POTASSIUM CHLORIDE, CALCIUM CHLORIDE 600; 310; 30; 20 MG/100ML; MG/100ML; MG/100ML; MG/100ML
9 INJECTION, SOLUTION INTRAVENOUS CONTINUOUS PRN
Status: DISCONTINUED | OUTPATIENT
Start: 2024-05-17 | End: 2024-05-17 | Stop reason: HOSPADM

## 2024-05-17 RX ADMIN — PROPOFOL 60 MG: 10 INJECTION, EMULSION INTRAVENOUS at 16:20

## 2024-05-17 RX ADMIN — LIDOCAINE HYDROCHLORIDE 100 MG: 20 INJECTION, SOLUTION INFILTRATION; PERINEURAL at 16:20

## 2024-05-17 RX ADMIN — SODIUM CHLORIDE, POTASSIUM CHLORIDE, SODIUM LACTATE AND CALCIUM CHLORIDE 9 ML/HR: 600; 310; 30; 20 INJECTION, SOLUTION INTRAVENOUS at 13:30

## 2024-05-17 NOTE — H&P
Patient Care Team:  Felix Dee MD as PCP - General (Family Medicine)  Mickey Johnson MD PhD as Referring Physician (Hematology and Oncology)  Seth Rios MD PhD as Referring Physician (Thoracic Surgery)    CHIEF COMPLAINT: dyspepsia and N/V    HISTORY OF PRESENT ILLNESS:  EGD for dyspepsia and N/V.     Past Medical History:   Diagnosis Date    ADHD (attention deficit hyperactivity disorder)     Anxiety     Arthritis     Degeneration of lumbar intervertebral disc 2003    GERD (gastroesophageal reflux disease)     Hypothyroidism     Low back pain     Migraine headache     Nausea and vomiting 07/19/2023    Ulcer      Past Surgical History:   Procedure Laterality Date    COLONOSCOPY N/A 07/24/2023    Procedure: COLONOSCOPY WITH BIOPSY;  Surgeon: Luis Enrique Wade MD;  Location:  [facility] OR;  Service: Gastroenterology;  Laterality: N/A;  Biopsies- right and left colon; External hemorrhoids    ENDOSCOPY N/A 07/24/2023    Procedure: ESOPHAGOGASTRODUODENOSCOPY WITH BIOPSY;  Surgeon: Luis Enrique Wade MD;  Location:  [facility] OR;  Service: Gastroenterology;  Laterality: N/A;  Duodenitis; Gastritis; Biopsies- gastric    WISDOM TOOTH EXTRACTION       Family History   Problem Relation Age of Onset    Fibromyalgia Mother     Hypertension Mother     Lupus Mother     Colon polyps Mother     Hypertension Father     Colon polyps Father     Breast cancer Maternal Grandmother     Endometriosis Maternal Grandmother     Breast cancer Paternal Grandmother     Colon cancer Neg Hx      Social History     Tobacco Use    Smoking status: Every Day     Current packs/day: 0.25     Average packs/day: 0.3 packs/day for 15.0 years (3.8 ttl pk-yrs)     Types: Cigarettes     Passive exposure: Current (>15 yrs ago)    Smokeless tobacco: Never    Tobacco comments:     N/A   Vaping Use    Vaping status: Never Used   Substance Use Topics    Alcohol use: Yes     Alcohol/week: 48.0 standard drinks of alcohol     Types: 6 Glasses of wine, 42  Shots of liquor per week     Comment: A glass a night or a wine cooler/ 42 shots per week per pt    Drug use: Not Currently     Types: Marijuana     Comment: in the past     Medications Prior to Admission   Medication Sig Dispense Refill Last Dose    diazePAM (VALIUM) 5 MG tablet Take 1 tablet by mouth At Night As Needed for Anxiety.   5/16/2024    DULoxetine (CYMBALTA) 60 MG capsule Take 1 capsule by mouth Daily.   5/16/2024    loperamide (IMODIUM) 2 MG capsule Take 1 capsule by mouth 4 (Four) Times a Day As Needed.   5/15/2024    LORazepam (ATIVAN) 0.5 MG tablet Take 1 tablet by mouth Every 8 (Eight) Hours As Needed for Anxiety. 30 tablet 0 5/16/2024    ondansetron ODT (ZOFRAN-ODT) 8 MG disintegrating tablet Place 1 tablet on the tongue Every 8 (Eight) Hours As Needed for Nausea. 24 tablet 11 Past Week    pantoprazole (PROTONIX) 40 MG EC tablet Take 1 tablet by mouth Daily.   5/17/2024 at 0700    promethazine (PHENERGAN) 25 MG tablet Take 1 tablet by mouth Every 6 (Six) Hours As Needed for Nausea or Vomiting. 120 tablet 11 5/17/2024 at 0700    riFAXIMin (XIFAXAN) 550 MG tablet Take 1 tablet by mouth Every 8 (Eight) Hours for 14 days. 42 tablet 0 5/16/2024    sucralfate (Carafate) 1 GM/10ML suspension Take 10 mL by mouth 4 (Four) Times a Day. 414 mL 11 5/15/2024    Vonoprazan Fumarate (Voquezna) 20 MG tablet Take 1 tablet by mouth Daily. 30 tablet 1 Past Week     Allergies:  Latex, natural rubber    REVIEW OF SYSTEMS:  Please see the above history of present illness for pertinent positives and negatives.  The remainder of the patient's systems have been reviewed and are negative.     Vital Signs  Temp:  [97.7 °F (36.5 °C)] 97.7 °F (36.5 °C)  Heart Rate:  [85] 85  Resp:  [17] 17  BP: (135)/(93) 135/93    Flowsheet Rows      Flowsheet Row First Filed Value   Admission Height --   Admission Weight 112 kg (246 lb 12.8 oz) Documented at 05/17/2024 1316             Physical Exam:  Physical Exam   Constitutional: Patient  appears well-developed and well-nourished and in no acute distress   HEENT:   Head: Normocephalic and atraumatic.   Eyes:  Pupils are equal, round, and reactive to light. EOM are intact. Sclerae are anicteric and non-injected.  Mouth and Throat: Patient has moist mucous membranes. Oropharynx is clear of any erythema or exudate.     Neck: Neck supple. No JVD present. No thyromegaly present. No lymphadenopathy present.  Cardiovascular: Regular rate, regular rhythm, S1 normal and S2 normal.  Exam reveals no gallop and no friction rub.  No murmur heard.  Pulmonary/Chest: Lungs are clear to auscultation bilaterally. No respiratory distress. No wheezes. No rhonchi. No rales.   Abdominal: Soft. Bowel sounds are normal. No distension and no mass. There is no hepatosplenomegaly. There is no tenderness.   Musculoskeletal: Normal Muscle tone  Extremities: No edema. Pulses are palpable in all 4 extremities.  Neurological: Patient is alert and oriented to person, place, and time. Cranial nerves II-XII are grossly intact with no focal deficits.  Skin: Skin is warm. No rash noted. Nails show no clubbing.  No cyanosis or erythema.    Debilities/Disabilities Identified: None  Emotional Behavior: Appropriate     Results Review:   I reviewed the patient's new clinical results.    Lab Results (most recent)       None            Imaging Results (Most Recent)       None          reviewed    ECG/EMG Results (most recent)       None          reviewed    Assessment & Plan   dyspepsia and N/V /  EGD      I discussed the patient's findings and my recommendations with patient.     Luis Enrique Wade MD  05/17/24  16:22 EDT    Time: 10 min prior to procedure.

## 2024-05-17 NOTE — ANESTHESIA POSTPROCEDURE EVALUATION
Patient: Bessy Mi    Procedure Summary       Date: 05/17/24 Room / Location: [facility] ENDOSCOPY 1 /  [facility] OR    Anesthesia Start: 1611 Anesthesia Stop: 1631    Procedure: ESOPHAGOGASTRODUODENOSCOPY WITH BIOPSY (Esophagus) Diagnosis:       Dyspepsia      (Dyspepsia [R10.13])    Surgeons: Luis Enrique Wade MD Provider: Kathi Gill CRNA    Anesthesia Type: MAC ASA Status: 3            Anesthesia Type: MAC    Vitals  Vitals Value Taken Time   /91 05/17/24 1701   Temp     Pulse 86 05/17/24 1702   Resp 12 05/17/24 1701   SpO2 97 % 05/17/24 1702   Vitals shown include unfiled device data.        Post Anesthesia Care and Evaluation    Patient location during evaluation: PHASE II  Patient participation: complete - patient participated  Level of consciousness: awake  Pain score: 0  Pain management: adequate    Airway patency: patent  Anesthetic complications: No anesthetic complications  PONV Status: none  Cardiovascular status: acceptable  Respiratory status: acceptable  Hydration status: acceptable

## 2024-05-17 NOTE — ANESTHESIA PREPROCEDURE EVALUATION
Anesthesia Evaluation     Patient summary reviewed and Nursing notes reviewed   no history of anesthetic complications:   NPO Solid Status: > 8 hours  NPO Liquid Status: > 8 hours           Airway   Mallampati: III  TM distance: >3 FB  Large neck circumference and Possible difficult intubation  Dental - normal exam     Pulmonary - normal exam   (+) a smoker Current, Smoked day of surgery,shortness of breath, sleep apnea    ROS comment: Multiple pulmonary nodules determined by computed tomography of lung  Cardiovascular - normal exam  Exercise tolerance: poor (<4 METS)    ECG reviewed  Rhythm: regular  Rate: normal    (+) hyperlipidemia (denies)    ROS comment: Previous ECG: no previous ECG available  Comments: Sinus rhythm, rate of 65, normal axis, no acute ST elevation or  depression.  Interpreted by Jose J Taylor MD on 3/30/2023  1:52 PM EDT              Neuro/Psych  (+) headaches, psychiatric history Anxiety and ADHD  GI/Hepatic/Renal/Endo    (+) obesity, GERD poorly controlled, PUD, thyroid problem (denies) hypothyroidism    Musculoskeletal     Abdominal   (+) obese   Substance History   (+) alcohol use (pint liquor per day)     OB/GYN negative ob/gyn ROS         Other   arthritis,                   Anesthesia Plan    ASA 3     MAC     intravenous induction     Anesthetic plan, risks, benefits, and alternatives have been provided, discussed and informed consent has been obtained with: patient.  Pre-procedure education provided  Use of blood products discussed with patient  Consented to blood products.    Plan discussed with CRNA.      CODE STATUS:

## 2024-05-23 LAB
LAB AP CASE REPORT: NORMAL
LAB AP SPECIAL STAINS: NORMAL
PATH REPORT.FINAL DX SPEC: NORMAL
PATH REPORT.GROSS SPEC: NORMAL

## 2024-05-31 ENCOUNTER — HOSPITAL ENCOUNTER (OUTPATIENT)
Dept: NUCLEAR MEDICINE | Facility: HOSPITAL | Age: 39
Discharge: HOME | End: 2024-05-31
Payer: COMMERCIAL

## 2024-05-31 DIAGNOSIS — R10.13 DYSPEPSIA: ICD-10-CM

## 2024-05-31 PROCEDURE — 78226 HEPATOBILIARY SYSTEM IMAGING: CPT

## 2024-05-31 PROCEDURE — 0 TECHNETIUM TC 99M MEBROFENIN KIT: Performed by: STUDENT IN AN ORGANIZED HEALTH CARE EDUCATION/TRAINING PROGRAM

## 2024-05-31 PROCEDURE — A9537 TC99M MEBROFENIN: HCPCS | Performed by: STUDENT IN AN ORGANIZED HEALTH CARE EDUCATION/TRAINING PROGRAM

## 2024-05-31 RX ORDER — KIT FOR THE PREPARATION OF TECHNETIUM TC 99M MEBROFENIN 45 MG/10ML
1 INJECTION, POWDER, LYOPHILIZED, FOR SOLUTION INTRAVENOUS
Status: COMPLETED | OUTPATIENT
Start: 2024-05-31 | End: 2024-05-31

## 2024-05-31 RX ADMIN — MEBROFENIN 1 DOSE: 45 INJECTION, POWDER, LYOPHILIZED, FOR SOLUTION INTRAVENOUS at 10:46

## 2024-06-02 NOTE — PROGRESS NOTES
- HIDA scan obtained for dyspepsia which was negative/unremarkable study.   - POC: Continue current medications for mild gastritis seen on EGD. Has GET pending to further evaluate.

## 2024-06-05 ENCOUNTER — HOSPITAL ENCOUNTER (OUTPATIENT)
Dept: MRI IMAGING | Facility: HOSPITAL | Age: 39
Discharge: HOME | End: 2024-06-05
Payer: COMMERCIAL

## 2024-06-05 DIAGNOSIS — M54.50 LOW BACK PAIN, UNSPECIFIED BACK PAIN LATERALITY, UNSPECIFIED CHRONICITY, UNSPECIFIED WHETHER SCIATICA PRESENT: ICD-10-CM

## 2024-06-05 PROCEDURE — 72148 MRI LUMBAR SPINE W/O DYE: CPT

## 2024-06-28 ENCOUNTER — TELEPHONE (OUTPATIENT)
Dept: SLEEP MEDICINE | Facility: HOSPITAL | Age: 39
End: 2024-06-28
Payer: COMMERCIAL

## 2024-06-28 NOTE — TELEPHONE ENCOUNTER
Patient called and advised she has not heard anything from the DME. Sending order to [facility]. Patient to have compliance at [facility].

## 2024-07-10 ENCOUNTER — HOSPITAL ENCOUNTER (OUTPATIENT)
Dept: NUCLEAR MEDICINE | Facility: HOSPITAL | Age: 39
Discharge: HOME | End: 2024-07-10
Payer: COMMERCIAL

## 2024-07-10 DIAGNOSIS — R10.13 DYSPEPSIA: ICD-10-CM

## 2024-07-10 DIAGNOSIS — R11.2 NAUSEA AND VOMITING, UNSPECIFIED VOMITING TYPE: ICD-10-CM

## 2024-07-10 PROCEDURE — A9541 TC99M SULFUR COLLOID: HCPCS | Performed by: STUDENT IN AN ORGANIZED HEALTH CARE EDUCATION/TRAINING PROGRAM

## 2024-07-10 PROCEDURE — 78264 GASTRIC EMPTYING IMG STUDY: CPT

## 2024-07-10 PROCEDURE — 0 TECHNETIUM SULFUR COLLOID: Performed by: STUDENT IN AN ORGANIZED HEALTH CARE EDUCATION/TRAINING PROGRAM

## 2024-07-10 RX ADMIN — TECHNETIUM TC 99M SULFUR COLLOID 1 DOSE: KIT at 10:41

## 2024-07-15 NOTE — PROGRESS NOTES
- GET obtained for dyspepsia and N/V which was negative for gastroparesis   - POC: Continue Voquezna, Carafate, and PRN Phenergan  45y Female S/P dick en y 3/3/20, GRECIA on CPAP, hypothyroidism, Gastric ulcer, presents with worsening LUE erythema and edema/pain since the day of her procedure. The area was marked prior to DC from the hospital POD 1. She noticed spread of the erythema beyond the area that had been previously marked, with worsening pain in her wrist and forearm. She presented to ED on Sunday and was given doxycyclin and keflex however patient was unable to tolerate the antibiotics due to vomiting after recent surgery. Patient otherwise denies fevers, chills, sob, palpitations, purulence, drainage, paresthesia abd pain, dysuria, constipation, diarrhea. In ED patient was evaluated by surgery with low suspicion of compartment syndrome. She received clindamycin initially in ED. She states she subsequently developed R arm maculopapular rash which is pruritic improved with benadryl. Antibiotics then changed to vancomycin. Venous duplex showing superficial thrombophelebitis of L cephalic vein

## 2024-08-21 ENCOUNTER — OFFICE VISIT (OUTPATIENT)
Dept: GASTROENTEROLOGY | Facility: CLINIC | Age: 39
End: 2024-08-21
Payer: COMMERCIAL

## 2024-08-21 VITALS
DIASTOLIC BLOOD PRESSURE: 90 MMHG | BODY MASS INDEX: 40.85 KG/M2 | SYSTOLIC BLOOD PRESSURE: 138 MMHG | HEIGHT: 66 IN | WEIGHT: 254.2 LBS

## 2024-08-21 DIAGNOSIS — K21.9 GASTROESOPHAGEAL REFLUX DISEASE WITHOUT ESOPHAGITIS: ICD-10-CM

## 2024-08-21 DIAGNOSIS — R11.2 NAUSEA AND VOMITING, UNSPECIFIED VOMITING TYPE: ICD-10-CM

## 2024-08-21 DIAGNOSIS — K58.0 IRRITABLE BOWEL SYNDROME WITH DIARRHEA: Primary | ICD-10-CM

## 2024-08-21 DIAGNOSIS — R10.13 DYSPEPSIA: ICD-10-CM

## 2024-08-21 PROBLEM — B96.81 HELICOBACTER PYLORI GASTRITIS: Status: RESOLVED | Noted: 2024-05-09 | Resolved: 2024-08-21

## 2024-08-21 PROBLEM — K29.70 HELICOBACTER PYLORI GASTRITIS: Status: RESOLVED | Noted: 2024-05-09 | Resolved: 2024-08-21

## 2024-08-21 RX ORDER — VONOPRAZAN FUMARATE 26.72 MG/1
20 TABLET ORAL DAILY
Qty: 30 TABLET | Refills: 1 | Status: SHIPPED | OUTPATIENT
Start: 2024-08-21

## 2024-08-21 RX ORDER — AMITRIPTYLINE HYDROCHLORIDE 25 MG/1
25 TABLET, FILM COATED ORAL NIGHTLY
Qty: 30 TABLET | Refills: 11 | Status: SHIPPED | OUTPATIENT
Start: 2024-08-21

## 2024-08-21 RX ORDER — ONDANSETRON 8 MG/1
8 TABLET, ORALLY DISINTEGRATING ORAL EVERY 8 HOURS PRN
Qty: 90 TABLET | Refills: 11 | Status: SHIPPED | OUTPATIENT
Start: 2024-08-21

## 2024-08-21 NOTE — PROGRESS NOTES
Bessy Mi is a 39 y.o. female with  PMH of H Pylori gastritis, GERD, YENI on CPAP + noted below who presents with   Chief Complaint   Patient presents with    Heartburn    Irritable Bowel Syndrome    H. Pylori Infection        Subjective     # Dyspepsia, improved   # H Pylori infection, resolved    # Nausea and Vomiting, improved   # GERD    - Adherent to Protonix 40 mg QD, Carafate, and PRN Phenergan. Reports she never received the prescription of Voquezna and she is out of samples for it.  Voquezna did work well for her when she had it and would like to get more samples.   - Reports improvement in heartburn and dyspepsia. She believes a component of her improvement may also be due to starting CPAP for her previously undiagnosed YENI which has significantly improved her sleep.   - EGD in 5/2024 had grade A esophagitis, gastritis (biopsied -- negative for H Pylori).   - Previously treated with quadruple therapy for H Pylori gastritis in 7/2023.   - HIDA scan and GET were both normal.       # IBS-D   - Prescribed another 2 week course of Rifaximin in the interim but patient reports she never received it.   - Continues to endorse intermittent diarrhea.  - Treated with a 2 week course of Rifaximin in 7/2024 which initially provided her relief but within the last 3 to 4 months has had recurrence of her diarrhea. Reports it occasionally alternates with constipation.             Past Medical History:   Diagnosis Date    ADHD (attention deficit hyperactivity disorder)     Anxiety     Arthritis     Degeneration of lumbar intervertebral disc 2003    GERD (gastroesophageal reflux disease)     Hypothyroidism     Low back pain     Migraine headache     Nausea and vomiting 07/19/2023    Ulcer        Social History     Socioeconomic History    Marital status:      Spouse name: Rakesh    Number of children: 2   Tobacco Use    Smoking status: Every Day     Current packs/day: 0.25     Average packs/day: 0.3 packs/day for  15.0 years (3.8 ttl pk-yrs)     Types: Cigarettes     Passive exposure: Current (>15 yrs ago)    Smokeless tobacco: Never    Tobacco comments:     N/A   Vaping Use    Vaping status: Never Used   Substance and Sexual Activity    Alcohol use: Yes     Alcohol/week: 48.0 standard drinks of alcohol     Types: 6 Glasses of wine, 42 Shots of liquor per week     Comment: A glass a night or a wine cooler/ 42 shots per week per pt    Drug use: Not Currently     Types: Marijuana     Comment: in the past    Sexual activity: Yes     Partners: Male     Birth control/protection: Partner of same sex         Current Outpatient Medications:     diazePAM (VALIUM) 5 MG tablet, Take 1 tablet by mouth At Night As Needed for Anxiety., Disp: , Rfl:     DULoxetine (CYMBALTA) 60 MG capsule, Take 1 capsule by mouth Daily., Disp: , Rfl:     loperamide (IMODIUM) 2 MG capsule, Take 1 capsule by mouth 4 (Four) Times a Day As Needed., Disp: , Rfl:     pantoprazole (PROTONIX) 40 MG EC tablet, Take 1 tablet by mouth Daily., Disp: , Rfl:     promethazine (PHENERGAN) 25 MG tablet, Take 1 tablet by mouth Every 6 (Six) Hours As Needed for Nausea or Vomiting., Disp: 120 tablet, Rfl: 11    sucralfate (Carafate) 1 GM/10ML suspension, Take 10 mL by mouth 4 (Four) Times a Day., Disp: 414 mL, Rfl: 11    Vonoprazan Fumarate (Voquezna) 20 MG tablet, Take 1 tablet by mouth Daily., Disp: 30 tablet, Rfl: 1    amitriptyline (ELAVIL) 25 MG tablet, Take 1 tablet by mouth Every Night., Disp: 30 tablet, Rfl: 11    ondansetron ODT (ZOFRAN-ODT) 8 MG disintegrating tablet, Place 1 tablet on the tongue Every 8 (Eight) Hours As Needed for Vomiting or Nausea., Disp: 90 tablet, Rfl: 11    riFAXIMin (XIFAXAN) 550 MG tablet, Take 1 tablet by mouth Every 8 (Eight) Hours for 14 days., Disp: 42 tablet, Rfl: 1    Objective   Vitals:    08/21/24 1402   BP: 138/90         08/21/24  1402   Weight: 115 kg (254 lb 3.2 oz)     Body mass index is 41.05 kg/m².      Physical Exam  Vitals  reviewed.   Constitutional:       Appearance: Normal appearance.   HENT:      Head: Normocephalic and atraumatic.   Eyes:      Extraocular Movements: Extraocular movements intact.      Conjunctiva/sclera: Conjunctivae normal.   Cardiovascular:      Rate and Rhythm: Normal rate and regular rhythm.      Heart sounds: Normal heart sounds.   Pulmonary:      Effort: Pulmonary effort is normal.      Breath sounds: Normal breath sounds.   Abdominal:      General: Abdomen is flat. Bowel sounds are normal. There is no distension.      Palpations: Abdomen is soft.      Tenderness: There is no abdominal tenderness.   Neurological:      Mental Status: She is alert.   Psychiatric:         Mood and Affect: Mood normal.         Behavior: Behavior normal.         WBC   Date Value Ref Range Status   05/02/2024 6.91 3.40 - 10.80 10*3/mm3 Final     RBC   Date Value Ref Range Status   05/02/2024 3.76 (L) 3.77 - 5.28 10*6/mm3 Final     Hemoglobin   Date Value Ref Range Status   05/02/2024 13.0 12.0 - 15.9 g/dL Final     Hematocrit   Date Value Ref Range Status   05/02/2024 38.4 34.0 - 46.6 % Final     MCV   Date Value Ref Range Status   05/02/2024 102.1 (H) 79.0 - 97.0 fL Final     MCH   Date Value Ref Range Status   05/02/2024 34.6 (H) 26.6 - 33.0 pg Final     MCHC   Date Value Ref Range Status   05/02/2024 33.9 31.5 - 35.7 g/dL Final     RDW   Date Value Ref Range Status   05/02/2024 14.2 12.3 - 15.4 % Final     RDW-SD   Date Value Ref Range Status   05/02/2024 53.2 37.0 - 54.0 fl Final     MPV   Date Value Ref Range Status   05/02/2024 10.0 6.0 - 12.0 fL Final     Platelets   Date Value Ref Range Status   05/02/2024 221 140 - 450 10*3/mm3 Final     Neutrophil %   Date Value Ref Range Status   05/02/2024 62.7 42.7 - 76.0 % Final     Lymphocyte %   Date Value Ref Range Status   05/02/2024 30.8 19.6 - 45.3 % Final     Monocyte %   Date Value Ref Range Status   05/02/2024 5.2 5.0 - 12.0 % Final     Eosinophil %   Date Value Ref Range  Status   05/02/2024 0.3 0.3 - 6.2 % Final     Basophil %   Date Value Ref Range Status   05/02/2024 0.6 0.0 - 1.5 % Final     Immature Grans %   Date Value Ref Range Status   05/02/2024 0.4 0.0 - 0.5 % Final     Neutrophils, Absolute   Date Value Ref Range Status   05/02/2024 4.33 1.70 - 7.00 10*3/mm3 Final     Lymphocytes, Absolute   Date Value Ref Range Status   05/02/2024 2.13 0.70 - 3.10 10*3/mm3 Final     Monocytes, Absolute   Date Value Ref Range Status   05/02/2024 0.36 0.10 - 0.90 10*3/mm3 Final     Eosinophils, Absolute   Date Value Ref Range Status   05/02/2024 0.02 0.00 - 0.40 10*3/mm3 Final     Basophils, Absolute   Date Value Ref Range Status   05/02/2024 0.04 0.00 - 0.20 10*3/mm3 Final     Immature Grans, Absolute   Date Value Ref Range Status   05/02/2024 0.03 0.00 - 0.05 10*3/mm3 Final     nRBC   Date Value Ref Range Status   05/02/2024 0.0 0.0 - 0.2 /100 WBC Final       Lab Results   Component Value Date    GLUCOSE 90 05/02/2024    BUN 12 05/02/2024    CREATININE 0.91 05/02/2024    EGFRIFNONA 75 01/23/2020    EGFRIFAFRI 91 08/08/2018    BCR 13.2 05/02/2024    CO2 20.0 (L) 05/02/2024    CALCIUM 8.8 05/02/2024    PROTENTOTREF 6.2 08/08/2018    ALBUMIN 3.9 05/02/2024    LABIL2 1.8 08/08/2018    AST 33 (H) 05/02/2024    ALT 34 (H) 05/02/2024         Imaging Results (Last 7 Days)       ** No results found for the last 168 hours. **              Assessment & Plan   Diagnoses and all orders for this visit:    1. Irritable bowel syndrome with diarrhea (Primary)  Assessment & Plan:  - Has had recurrence of symptoms after responding well in 7/2023  - Start 2 week course of Rifaximin 550 mg TID   - Start Elavil 25 mg QHS   - Discussed starting Viberzi if unable to get Rifaximin     Orders:  -     riFAXIMin (XIFAXAN) 550 MG tablet; Take 1 tablet by mouth Every 8 (Eight) Hours for 14 days.  Dispense: 42 tablet; Refill: 1  -     amitriptyline (ELAVIL) 25 MG tablet; Take 1 tablet by mouth Every Night.  Dispense:  30 tablet; Refill: 11    2. Dyspepsia  Assessment & Plan:  - Continue Carafate   - Start Elavil 25 mg QHS   - Continue Voquezna. Prescription re-ordered and samples provided during encounter       3. Gastroesophageal reflux disease without esophagitis  Assessment & Plan:  - Continue Voquezna. Prescription re-ordered and samples provided during encounter     Orders:  -     Vonoprazan Fumarate (Voquezna) 20 MG tablet; Take 1 tablet by mouth Daily.  Dispense: 30 tablet; Refill: 1    4. Nausea and vomiting, unspecified vomiting type  Assessment & Plan:  - Continue PRN Phenergan 25 mg QID   - Start PRN Zofran 8 mg q8h as requested as patient reports drowsiness with Phenergan use     Orders:  -     ondansetron ODT (ZOFRAN-ODT) 8 MG disintegrating tablet; Place 1 tablet on the tongue Every 8 (Eight) Hours As Needed for Vomiting or Nausea.  Dispense: 90 tablet; Refill: 11      RTC in 3 months     I have discussed the above plan with the patient.  They verbalize understanding and are in agreement with the plan.  They have been advised to contact the office for any questions, concerns, or changes related to their health.

## 2024-08-21 NOTE — ASSESSMENT & PLAN NOTE
- Continue PRN Phenergan 25 mg QID   - Start PRN Zofran 8 mg q8h as requested as patient reports drowsiness with Phenergan use

## 2024-08-21 NOTE — ASSESSMENT & PLAN NOTE
- Continue Carafate   - Start Elavil 25 mg QHS   - Continue Voquezna. Prescription re-ordered and samples provided during encounter

## 2024-08-21 NOTE — ASSESSMENT & PLAN NOTE
- Has had recurrence of symptoms after responding well in 7/2023  - Start 2 week course of Rifaximin 550 mg TID   - Start Elavil 25 mg QHS   - Discussed starting Viberzi if unable to get Rifaximin

## 2024-08-29 ENCOUNTER — HOSPITAL ENCOUNTER (OUTPATIENT)
Dept: PULMONOLOGY | Facility: HOSPITAL | Age: 39
Discharge: HOME | End: 2024-08-29
Payer: COMMERCIAL

## 2024-08-29 ENCOUNTER — HOSPITAL ENCOUNTER (OUTPATIENT)
Dept: CT IMAGING | Facility: HOSPITAL | Age: 39
Discharge: HOME | End: 2024-08-29
Payer: COMMERCIAL

## 2024-08-29 VITALS — HEART RATE: 72 BPM | OXYGEN SATURATION: 97 % | RESPIRATION RATE: 20 BRPM

## 2024-08-29 DIAGNOSIS — R06.00 DYSPNEA, UNSPECIFIED TYPE: ICD-10-CM

## 2024-08-29 DIAGNOSIS — R91.1 LUNG NODULE: ICD-10-CM

## 2024-08-29 LAB
BDY SITE: ABNORMAL
HGB BLDA-MCNC: 12.7 G/DL (ref 13.5–17.5)
Lab: ABNORMAL

## 2024-08-29 PROCEDURE — 94060 EVALUATION OF WHEEZING: CPT

## 2024-08-29 PROCEDURE — 82820 HEMOGLOBIN-OXYGEN AFFINITY: CPT

## 2024-08-29 PROCEDURE — 94729 DIFFUSING CAPACITY: CPT

## 2024-08-29 PROCEDURE — 71250 CT THORAX DX C-: CPT

## 2024-08-29 PROCEDURE — 94726 PLETHYSMOGRAPHY LUNG VOLUMES: CPT

## 2024-08-29 RX ORDER — ALBUTEROL SULFATE 0.83 MG/ML
2.5 SOLUTION RESPIRATORY (INHALATION) ONCE
Status: COMPLETED | OUTPATIENT
Start: 2024-08-29 | End: 2024-08-29

## 2024-08-29 RX ADMIN — ALBUTEROL SULFATE 2.5 MG: 2.5 SOLUTION RESPIRATORY (INHALATION) at 15:04

## 2024-11-30 ENCOUNTER — HOSPITAL ENCOUNTER (EMERGENCY)
Facility: HOSPITAL | Age: 39
Discharge: HOME | End: 2024-11-30
Attending: STUDENT IN AN ORGANIZED HEALTH CARE EDUCATION/TRAINING PROGRAM
Payer: COMMERCIAL

## 2024-11-30 ENCOUNTER — APPOINTMENT (OUTPATIENT)
Dept: GENERAL RADIOLOGY | Facility: HOSPITAL | Age: 39
End: 2024-11-30
Payer: COMMERCIAL

## 2024-11-30 ENCOUNTER — APPOINTMENT (OUTPATIENT)
Dept: CT IMAGING | Facility: HOSPITAL | Age: 39
End: 2024-11-30
Payer: COMMERCIAL

## 2024-11-30 VITALS
TEMPERATURE: 98.2 F | WEIGHT: 250 LBS | BODY MASS INDEX: 40.18 KG/M2 | OXYGEN SATURATION: 97 % | RESPIRATION RATE: 16 BRPM | DIASTOLIC BLOOD PRESSURE: 99 MMHG | SYSTOLIC BLOOD PRESSURE: 146 MMHG | HEIGHT: 66 IN | HEART RATE: 75 BPM

## 2024-11-30 DIAGNOSIS — R51.9 RIGHT-SIDED HEADACHE: ICD-10-CM

## 2024-11-30 DIAGNOSIS — H10.31 ACUTE CONJUNCTIVITIS OF RIGHT EYE, UNSPECIFIED ACUTE CONJUNCTIVITIS TYPE: Primary | ICD-10-CM

## 2024-11-30 LAB
ALBUMIN SERPL-MCNC: 4 G/DL (ref 3.5–5.2)
ALBUMIN/GLOB SERPL: 1.3 G/DL
ALP SERPL-CCNC: 92 U/L (ref 39–117)
ALT SERPL W P-5'-P-CCNC: 41 U/L (ref 1–33)
ANION GAP SERPL CALCULATED.3IONS-SCNC: 13.8 MMOL/L (ref 5–15)
ANISOCYTOSIS BLD QL: NORMAL
AST SERPL-CCNC: 57 U/L (ref 1–32)
BASOPHILS # BLD AUTO: 0.03 10*3/MM3 (ref 0–0.2)
BASOPHILS NFR BLD AUTO: 0.6 % (ref 0–1.5)
BILIRUB SERPL-MCNC: 0.7 MG/DL (ref 0–1.2)
BUN SERPL-MCNC: 12 MG/DL (ref 6–20)
BUN/CREAT SERPL: 16 (ref 7–25)
CALCIUM SPEC-SCNC: 9 MG/DL (ref 8.6–10.5)
CHLORIDE SERPL-SCNC: 101 MMOL/L (ref 98–107)
CO2 SERPL-SCNC: 22.2 MMOL/L (ref 22–29)
CREAT SERPL-MCNC: 0.75 MG/DL (ref 0.57–1)
DEPRECATED RDW RBC AUTO: 70.3 FL (ref 37–54)
EGFRCR SERPLBLD CKD-EPI 2021: 104.7 ML/MIN/1.73
EOSINOPHIL # BLD AUTO: 0.04 10*3/MM3 (ref 0–0.4)
EOSINOPHIL NFR BLD AUTO: 0.7 % (ref 0.3–6.2)
ERYTHROCYTE [DISTWIDTH] IN BLOOD BY AUTOMATED COUNT: 17.8 % (ref 12.3–15.4)
GLOBULIN UR ELPH-MCNC: 3.2 GM/DL
GLUCOSE BLDC GLUCOMTR-MCNC: 99 MG/DL (ref 70–130)
GLUCOSE SERPL-MCNC: 101 MG/DL (ref 65–99)
HCG SERPL QL: NEGATIVE
HCT VFR BLD AUTO: 41.2 % (ref 34–46.6)
HGB BLD-MCNC: 13.7 G/DL (ref 12–15.9)
HOLD SPECIMEN: NORMAL
IMM GRANULOCYTES # BLD AUTO: 0.01 10*3/MM3 (ref 0–0.05)
IMM GRANULOCYTES NFR BLD AUTO: 0.2 % (ref 0–0.5)
INR PPP: 0.99 (ref 0.9–1.1)
LYMPHOCYTES # BLD AUTO: 1.12 10*3/MM3 (ref 0.7–3.1)
LYMPHOCYTES NFR BLD AUTO: 20.9 % (ref 19.6–45.3)
MACROCYTES BLD QL SMEAR: NORMAL
MCH RBC QN AUTO: 35.2 PG (ref 26.6–33)
MCHC RBC AUTO-ENTMCNC: 33.3 G/DL (ref 31.5–35.7)
MCV RBC AUTO: 105.9 FL (ref 79–97)
MONOCYTES # BLD AUTO: 0.3 10*3/MM3 (ref 0.1–0.9)
MONOCYTES NFR BLD AUTO: 5.6 % (ref 5–12)
NEUTROPHILS NFR BLD AUTO: 3.86 10*3/MM3 (ref 1.7–7)
NEUTROPHILS NFR BLD AUTO: 72 % (ref 42.7–76)
NRBC BLD AUTO-RTO: 0 /100 WBC (ref 0–0.2)
PLAT MORPH BLD: NORMAL
PLATELET # BLD AUTO: 193 10*3/MM3 (ref 140–450)
PMV BLD AUTO: 9.6 FL (ref 6–12)
POTASSIUM SERPL-SCNC: 4 MMOL/L (ref 3.5–5.2)
PROT SERPL-MCNC: 7.2 G/DL (ref 6–8.5)
PROTHROMBIN TIME: 13.5 SECONDS (ref 12.1–15)
RBC # BLD AUTO: 3.89 10*6/MM3 (ref 3.77–5.28)
SODIUM SERPL-SCNC: 137 MMOL/L (ref 136–145)
WBC MORPH BLD: NORMAL
WBC NRBC COR # BLD AUTO: 5.36 10*3/MM3 (ref 3.4–10.8)
WHOLE BLOOD HOLD COAG: NORMAL
WHOLE BLOOD HOLD SPECIMEN: NORMAL

## 2024-11-30 PROCEDURE — 70496 CT ANGIOGRAPHY HEAD: CPT

## 2024-11-30 PROCEDURE — 25510000001 IOPAMIDOL PER 1 ML: Performed by: STUDENT IN AN ORGANIZED HEALTH CARE EDUCATION/TRAINING PROGRAM

## 2024-11-30 PROCEDURE — 25010000002 PROCHLORPERAZINE 10 MG/2ML SOLUTION: Performed by: STUDENT IN AN ORGANIZED HEALTH CARE EDUCATION/TRAINING PROGRAM

## 2024-11-30 PROCEDURE — 80053 COMPREHEN METABOLIC PANEL: CPT | Performed by: STUDENT IN AN ORGANIZED HEALTH CARE EDUCATION/TRAINING PROGRAM

## 2024-11-30 PROCEDURE — 96375 TX/PRO/DX INJ NEW DRUG ADDON: CPT

## 2024-11-30 PROCEDURE — 85610 PROTHROMBIN TIME: CPT | Performed by: STUDENT IN AN ORGANIZED HEALTH CARE EDUCATION/TRAINING PROGRAM

## 2024-11-30 PROCEDURE — 99285 EMERGENCY DEPT VISIT HI MDM: CPT | Performed by: STUDENT IN AN ORGANIZED HEALTH CARE EDUCATION/TRAINING PROGRAM

## 2024-11-30 PROCEDURE — 71045 X-RAY EXAM CHEST 1 VIEW: CPT

## 2024-11-30 PROCEDURE — 25010000002 KETOROLAC TROMETHAMINE PER 15 MG: Performed by: STUDENT IN AN ORGANIZED HEALTH CARE EDUCATION/TRAINING PROGRAM

## 2024-11-30 PROCEDURE — 96365 THER/PROPH/DIAG IV INF INIT: CPT

## 2024-11-30 PROCEDURE — 25010000002 MAGNESIUM SULFATE 2 GM/50ML SOLUTION: Performed by: STUDENT IN AN ORGANIZED HEALTH CARE EDUCATION/TRAINING PROGRAM

## 2024-11-30 PROCEDURE — 84703 CHORIONIC GONADOTROPIN ASSAY: CPT | Performed by: STUDENT IN AN ORGANIZED HEALTH CARE EDUCATION/TRAINING PROGRAM

## 2024-11-30 PROCEDURE — 85025 COMPLETE CBC W/AUTO DIFF WBC: CPT | Performed by: STUDENT IN AN ORGANIZED HEALTH CARE EDUCATION/TRAINING PROGRAM

## 2024-11-30 PROCEDURE — 25810000003 SODIUM CHLORIDE 0.9 % SOLUTION: Performed by: STUDENT IN AN ORGANIZED HEALTH CARE EDUCATION/TRAINING PROGRAM

## 2024-11-30 PROCEDURE — 82948 REAGENT STRIP/BLOOD GLUCOSE: CPT

## 2024-11-30 PROCEDURE — 70498 CT ANGIOGRAPHY NECK: CPT

## 2024-11-30 PROCEDURE — 85007 BL SMEAR W/DIFF WBC COUNT: CPT | Performed by: STUDENT IN AN ORGANIZED HEALTH CARE EDUCATION/TRAINING PROGRAM

## 2024-11-30 PROCEDURE — 70450 CT HEAD/BRAIN W/O DYE: CPT

## 2024-11-30 PROCEDURE — 93005 ELECTROCARDIOGRAM TRACING: CPT | Performed by: STUDENT IN AN ORGANIZED HEALTH CARE EDUCATION/TRAINING PROGRAM

## 2024-11-30 RX ORDER — SODIUM CHLORIDE 0.9 % (FLUSH) 0.9 %
10 SYRINGE (ML) INJECTION AS NEEDED
Status: DISCONTINUED | OUTPATIENT
Start: 2024-11-30 | End: 2024-11-30 | Stop reason: HOSPADM

## 2024-11-30 RX ORDER — IOPAMIDOL 755 MG/ML
50 INJECTION, SOLUTION INTRAVASCULAR
Status: COMPLETED | OUTPATIENT
Start: 2024-11-30 | End: 2024-11-30

## 2024-11-30 RX ORDER — PROCHLORPERAZINE EDISYLATE 5 MG/ML
10 INJECTION INTRAMUSCULAR; INTRAVENOUS ONCE
Status: COMPLETED | OUTPATIENT
Start: 2024-11-30 | End: 2024-11-30

## 2024-11-30 RX ORDER — CLINDAMYCIN HYDROCHLORIDE 150 MG/1
300 CAPSULE ORAL ONCE
Status: COMPLETED | OUTPATIENT
Start: 2024-11-30 | End: 2024-11-30

## 2024-11-30 RX ORDER — KETOROLAC TROMETHAMINE 30 MG/ML
15 INJECTION, SOLUTION INTRAMUSCULAR; INTRAVENOUS ONCE
Status: COMPLETED | OUTPATIENT
Start: 2024-11-30 | End: 2024-11-30

## 2024-11-30 RX ORDER — CLINDAMYCIN HYDROCHLORIDE 300 MG/1
300 CAPSULE ORAL 3 TIMES DAILY
Qty: 21 CAPSULE | Refills: 0 | Status: SHIPPED | OUTPATIENT
Start: 2024-11-30 | End: 2024-12-07

## 2024-11-30 RX ORDER — MAGNESIUM SULFATE HEPTAHYDRATE 40 MG/ML
2 INJECTION, SOLUTION INTRAVENOUS ONCE
Status: COMPLETED | OUTPATIENT
Start: 2024-11-30 | End: 2024-11-30

## 2024-11-30 RX ADMIN — Medication 10 ML: at 12:56

## 2024-11-30 RX ADMIN — IOPAMIDOL 50 ML: 755 INJECTION, SOLUTION INTRAVENOUS at 13:32

## 2024-11-30 RX ADMIN — SODIUM CHLORIDE 1000 ML: 9 INJECTION, SOLUTION INTRAVENOUS at 12:55

## 2024-11-30 RX ADMIN — AMOXICILLIN AND CLAVULANATE POTASSIUM 1 TABLET: 875; 125 TABLET, FILM COATED ORAL at 14:18

## 2024-11-30 RX ADMIN — Medication 10 ML: at 14:15

## 2024-11-30 RX ADMIN — CLINDAMYCIN HYDROCHLORIDE 300 MG: 150 CAPSULE ORAL at 14:18

## 2024-11-30 RX ADMIN — KETOROLAC TROMETHAMINE 15 MG: 30 INJECTION, SOLUTION INTRAMUSCULAR; INTRAVENOUS at 12:55

## 2024-11-30 RX ADMIN — MAGNESIUM SULFATE IN WATER FOR 2 G: 40 INJECTION INTRAVENOUS at 12:53

## 2024-11-30 RX ADMIN — PROCHLORPERAZINE EDISYLATE 10 MG: 5 INJECTION INTRAMUSCULAR; INTRAVENOUS at 12:55

## 2024-11-30 NOTE — DISCHARGE INSTRUCTIONS
Thank you for your visit to the Emergency Department.     It was a pleasure to take care of you in the emergency room today.     Today you were seen for right eye blurry vision, right headache. We performed a thorough history and physical exam.  We obtained CT scan of your head and your neck which did not show any acute abnormal findings.  We obtain your lab work which was also reassuring.  We provided you with IV medication to help with your symptoms as well as oral antibiotics in the ER.  At home, please take Augmentin and clindamycin as prescribed and follow-up with your primary care doctor as soon as you can.  We think that you likely had an allergic conjunctivitis versus viral conjunctivitis that led to the teary eyes.  However, given the eyelid swelling, sinus pain, we will treat you with antibiotics.      Please return to the nearest ED or call 911 if you experience:    Worsening symptoms, severe pain, difficulty breathing, chest pain, fever that doesn't break with Tylenol or Motrin, uncontrolled vomiting or diarrhea that doesn't stop, passing out, lethargy (drowsiness, hard to wake up), numbness/tingling/weakness on one side, speech difficulty, cannot walk, or any concerns for limb/life threatening issues.    The Emergency Department is open 24 hours a day.     Please follow up with: your primary care doctor within 1-3 days.    In the meantime, please:  Take acetaminophen 650mg every 6-8 hours and/or ibuprofen 600mg every 6-8 hours on a full stomach as needed for pain or fever.   Continue to take any other existing medications as prescribed.
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
## 2024-12-01 LAB
QT INTERVAL: 382 MS
QTC INTERVAL: 437 MS

## 2025-01-03 RX ORDER — LOPERAMIDE HYDROCHLORIDE 2 MG/1
CAPSULE ORAL
Qty: 240 CAPSULE | Refills: 10 | Status: SHIPPED | OUTPATIENT
Start: 2025-01-03

## 2025-05-07 RX ORDER — OMEPRAZOLE 40 MG/1
40 CAPSULE, DELAYED RELEASE ORAL
Qty: 180 CAPSULE | Refills: 2 | OUTPATIENT
Start: 2025-05-07

## 2025-08-08 DIAGNOSIS — R11.2 NAUSEA AND VOMITING, UNSPECIFIED VOMITING TYPE: ICD-10-CM

## 2025-08-08 RX ORDER — OMEPRAZOLE 40 MG/1
40 CAPSULE, DELAYED RELEASE ORAL
Qty: 180 CAPSULE | Refills: 2 | OUTPATIENT
Start: 2025-08-08

## 2025-08-08 RX ORDER — PROMETHAZINE HYDROCHLORIDE 25 MG/1
25 TABLET ORAL EVERY 6 HOURS PRN
Qty: 120 TABLET | Refills: 11 | OUTPATIENT
Start: 2025-08-08

## (undated) DEVICE — KT ORCA ORCAPOD DISP STRL

## (undated) DEVICE — BW-412T DISP COMBO CLEANING BRUSH: Brand: SINGLE USE COMBINATION CLEANING BRUSH

## (undated) DEVICE — VIAL FORMALIN CAP 10P 40ML

## (undated) DEVICE — LINER SURG CANSTR SXN S/RIGD 1500CC

## (undated) DEVICE — FRCP BX RADJAW4 NDL 2.8 240CM LG OG BX40

## (undated) DEVICE — SYR LL W/SCALE/MARK 3ML STRL

## (undated) DEVICE — ADAPT CLN BIOGUARD AIR/H2O DISP

## (undated) DEVICE — Device

## (undated) DEVICE — THE BITE BLOCK MAXI, LATEX FREE STRAP IS USED TO PROTECT THE ENDOSCOPE INSERTION TUBE FROM BEING BITTEN BY THE PATIENT.

## (undated) DEVICE — SOL IRR H2O BTL 1000ML STRL